# Patient Record
Sex: FEMALE | Race: WHITE | HISPANIC OR LATINO | Employment: FULL TIME | URBAN - METROPOLITAN AREA
[De-identification: names, ages, dates, MRNs, and addresses within clinical notes are randomized per-mention and may not be internally consistent; named-entity substitution may affect disease eponyms.]

---

## 2017-10-31 PROBLEM — M35.00 SJOGREN'S SYNDROME: Status: ACTIVE | Noted: 2017-10-31

## 2017-11-21 PROBLEM — M25.551 HIP PAIN, BILATERAL: Status: ACTIVE | Noted: 2017-11-21

## 2021-02-09 PROBLEM — R39.89 BLADDER PAIN: Status: ACTIVE | Noted: 2021-02-09

## 2023-08-07 PROBLEM — Z00.00 ANNUAL PHYSICAL EXAM: Status: RESOLVED | Noted: 2023-05-02 | Resolved: 2023-08-07

## 2023-12-26 ENCOUNTER — PATIENT MESSAGE (OUTPATIENT)
Dept: OBSTETRICS AND GYNECOLOGY | Facility: CLINIC | Age: 53
End: 2023-12-26
Payer: COMMERCIAL

## 2023-12-27 ENCOUNTER — PATIENT MESSAGE (OUTPATIENT)
Dept: OBSTETRICS AND GYNECOLOGY | Facility: CLINIC | Age: 53
End: 2023-12-27
Payer: COMMERCIAL

## 2023-12-29 ENCOUNTER — PATIENT MESSAGE (OUTPATIENT)
Dept: OBSTETRICS AND GYNECOLOGY | Facility: CLINIC | Age: 53
End: 2023-12-29
Payer: COMMERCIAL

## 2024-01-17 ENCOUNTER — PATIENT MESSAGE (OUTPATIENT)
Dept: OBSTETRICS AND GYNECOLOGY | Facility: CLINIC | Age: 54
End: 2024-01-17
Payer: COMMERCIAL

## 2024-01-17 DIAGNOSIS — N95.1 PERIMENOPAUSE: ICD-10-CM

## 2024-01-17 RX ORDER — NORETHINDRONE 5 MG/1
5 TABLET ORAL DAILY
Qty: 63 TABLET | Refills: 3 | Status: SHIPPED | OUTPATIENT
Start: 2024-01-17

## 2024-01-17 RX ORDER — HYDROCHLOROTHIAZIDE 25 MG/1
25 TABLET ORAL DAILY
Qty: 90 TABLET | Refills: 3 | Status: SHIPPED | OUTPATIENT
Start: 2024-01-17 | End: 2025-01-16

## 2024-01-18 RX ORDER — TESTOSTERONE CYPIONATE 200 MG/ML
50 INJECTION, SOLUTION INTRAMUSCULAR
Qty: 1 ML | Refills: 0 | Status: SHIPPED | OUTPATIENT
Start: 2024-01-18 | End: 2024-04-17 | Stop reason: SDUPTHER

## 2024-01-18 NOTE — TELEPHONE ENCOUNTER
Refill Routing Note   Medication(s) are not appropriate for processing by [facility] for the following reason(s):        Outside of protocol    [facility] action(s):  Route  Approve               Appointments  past 12m or future 3m with PCP    Date Provider   Last Visit   11/27/2023 Radha Patterson MD   Next Visit   2/20/2024 Radha Patterson MD   ED visits in past 90 days: 0        Note composed:6:07 PM 01/17/2024

## 2024-02-07 ENCOUNTER — HOSPITAL ENCOUNTER (OUTPATIENT)
Dept: RADIOLOGY | Facility: HOSPITAL | Age: 54
Discharge: HOME | End: 2024-02-07
Attending: STUDENT IN AN ORGANIZED HEALTH CARE EDUCATION/TRAINING PROGRAM
Payer: COMMERCIAL

## 2024-02-07 DIAGNOSIS — Z12.31 ENCOUNTER FOR SCREENING MAMMOGRAM FOR BREAST CANCER: ICD-10-CM

## 2024-02-07 PROCEDURE — 77067 SCR MAMMO BI INCL CAD: CPT | Mod: 26,,, | Performed by: RADIOLOGY

## 2024-02-07 PROCEDURE — 77063 BREAST TOMOSYNTHESIS BI: CPT | Mod: 26,,, | Performed by: RADIOLOGY

## 2024-02-07 PROCEDURE — 77067 SCR MAMMO BI INCL CAD: CPT | Mod: TC

## 2024-02-16 ENCOUNTER — OFFICE VISIT (OUTPATIENT)
Dept: OBSTETRICS AND GYNECOLOGY | Facility: CLINIC | Age: 54
End: 2024-02-16
Payer: COMMERCIAL

## 2024-02-16 VITALS
WEIGHT: 150 LBS | SYSTOLIC BLOOD PRESSURE: 107 MMHG | DIASTOLIC BLOOD PRESSURE: 67 MMHG | HEIGHT: 63 IN | BODY MASS INDEX: 26.58 KG/M2

## 2024-02-16 DIAGNOSIS — N95.1 MENOPAUSAL SYNDROME: Primary | ICD-10-CM

## 2024-02-16 DIAGNOSIS — E88.810 METABOLIC SYNDROME: ICD-10-CM

## 2024-02-16 DIAGNOSIS — E66.3 OVERWEIGHT: ICD-10-CM

## 2024-02-16 PROCEDURE — 1159F MED LIST DOCD IN RCRD: CPT | Mod: CPTII,S$GLB,, | Performed by: PHYSICIAN ASSISTANT

## 2024-02-16 PROCEDURE — 3074F SYST BP LT 130 MM HG: CPT | Mod: CPTII,S$GLB,, | Performed by: PHYSICIAN ASSISTANT

## 2024-02-16 PROCEDURE — 99213 OFFICE O/P EST LOW 20 MIN: CPT | Mod: S$GLB,,, | Performed by: PHYSICIAN ASSISTANT

## 2024-02-16 PROCEDURE — 3008F BODY MASS INDEX DOCD: CPT | Mod: CPTII,S$GLB,, | Performed by: PHYSICIAN ASSISTANT

## 2024-02-16 PROCEDURE — 1160F RVW MEDS BY RX/DR IN RCRD: CPT | Mod: CPTII,S$GLB,, | Performed by: PHYSICIAN ASSISTANT

## 2024-02-16 PROCEDURE — 99999 PR PBB SHADOW E&M-EST. PATIENT-LVL III: CPT | Mod: PBBFAC,,, | Performed by: PHYSICIAN ASSISTANT

## 2024-02-16 PROCEDURE — 3078F DIAST BP <80 MM HG: CPT | Mod: CPTII,S$GLB,, | Performed by: PHYSICIAN ASSISTANT

## 2024-02-16 NOTE — PROGRESS NOTES
Subjective:      Sameera Aldrich is a 53 y.o. female who presents for weight loss follow up. She is taking topamax 75mg Qhs but has been gaining weight. Increased stress with recent death in the family. Has also started having bleeding again with HRT. Scheduled with Dr. Patterson next week to discuss options. She did great with Ozempic in the past but this is no longer a covered option for. She would like to see what she can to do lose more weight.    Lab Visit on 2023   Component Date Value Ref Range Status    Estradiol 2023 29  See Text pg/mL Final   Lab Visit on 12/15/2023   Component Date Value Ref Range Status    Estradiol 12/15/2023 1907  See Text pg/mL Final    Testosterone, Free 12/15/2023 3.0  pg/mL Final    Testosterone, Total 12/15/2023 115 (H)  5 - 73 ng/dL Final    Cholesterol 12/15/2023 193  120 - 199 mg/dL Final    Triglycerides 12/15/2023 91  30 - 150 mg/dL Final    HDL 12/15/2023 31 (L)  40 - 75 mg/dL Final    LDL Cholesterol 12/15/2023 143.8  63.0 - 159.0 mg/dL Final    HDL/Cholesterol Ratio 12/15/2023 16.1 (L)  20.0 - 50.0 % Final    Total Cholesterol/HDL Ratio 12/15/2023 6.2 (H)  2.0 - 5.0 Final    Non-HDL Cholesterol 12/15/2023 162  mg/dL Final    Hemoglobin A1C 12/15/2023 5.4  4.0 - 5.6 % Final    Estimated Avg Glucose 12/15/2023 108  68 - 131 mg/dL Final       Past Medical History:   Diagnosis Date    Anemia     borderline    Encounter for blood transfusion     16 yrs ago    GERD (gastroesophageal reflux disease)     Sjogren's syndrome      Past Surgical History:   Procedure Laterality Date    BELT ABDOMINOPLASTY  2014     SECTION      COLONOSCOPY N/A 3/23/2019    Procedure: COLONOSCOPY;  Surgeon: Pete Soler MD;  Location: [address]);  Service: Endoscopy;  Laterality: N/A;    ECTOPIC PREGNANCY SURGERY      REPAIR OF COLLATERAL LIGAMENT OF THUMB Right 2022    Procedure: REPAIR, LIGAMENT, COLLATERAL, THUMB - right thumb ucl need arthrex;  Surgeon:  Manuel Baltazar MD;  Location: [facility] OR;  Service: Orthopedics;  Laterality: Right;    TOTAL REDUCTION MAMMOPLASTY      TUBAL LIGATION       Social History     Tobacco Use    Smoking status: Former    Smokeless tobacco: Former    Tobacco comments:     quit 20 years ago   Substance Use Topics    Alcohol use: Yes     Alcohol/week: 2.0 standard drinks of alcohol     Types: 2 Glasses of wine per week     Comment: socially    Drug use: No     Family History   Problem Relation Age of Onset    Breast cancer Mother 72    Heart disease Father 60    Breast cancer Maternal Aunt 65    Heart disease Maternal Uncle     Lung cancer Maternal Uncle         smoker    Heart disease Paternal Aunt     Cerebral aneurysm Maternal Grandmother     Heart disease Maternal Grandfather 52    Achalasia Paternal Grandmother     Sjogren's syndrome Paternal Grandfather     Ovarian cancer Maternal Aunt 60    Prostate cancer Maternal Uncle     Melanoma Neg Hx     Diabetes Neg Hx     Hypertension Neg Hx     Colon cancer Neg Hx     Cancer Neg Hx      OB History    Para Term  AB Living   3 2 2   1 2   SAB IAB Ectopic Multiple Live Births       1   2      # Outcome Date GA Lbr Damian/2nd Weight Sex Delivery Anes PTL Lv   3 Ectopic            2 Term      Vag-Spont   LETY   1 Term      CS-LTranv   LETY       Current Outpatient Medications:     ADIPEX-P 37.5 mg tablet, Take 37.5 mg by mouth., Disp: , Rfl:     bimatoprost (LATISSE) 0.03 % ophthalmic solution, , Disp: , Rfl:     ciclopirox (LOPROX) 0.77 % Susp, Apply topically 2 (two) times daily., Disp: 30 mL, Rfl: 3    estradioL (ESTRACE) 0.01 % (0.1 mg/gram) vaginal cream, Insert 0.5 grams with applicator or dime-sized amount with finger in vagina nightly for  2 weeks, then twice a week thereafter, Disp: 42.5 g, Rfl: 11    estradioL 1.25 mg/1.25 gram (0.1 %) GlPk, Place 1.25 mg (1 packet) onto the skin Daily., Disp: 37.5 g, Rfl: 6    finasteride (PROPECIA) 1 mg tablet, Take 1 tablet (1 mg  "total) by mouth once daily., Disp: 30 tablet, Rfl: 6    hydroCHLOROthiazide (HYDRODIURIL) 25 MG tablet, Take 1 tablet (25 mg total) by mouth once daily., Disp: 90 tablet, Rfl: 3    norethindrone (AYGESTIN) 5 mg Tab, Take 1 tablet (5 mg total) by mouth once daily. For 21 days, Disp: 63 tablet, Rfl: 3    semaglutide, weight loss, 0.25 mg/0.5 mL PnIj, Inject 0.25 mg into the skin every 7 days., Disp: 4 each, Rfl: 3    syringe with needle 3 mL 22 gauge x 1" Syrg, 1 Syringe by Misc.(Non-Drug; Combo Route) route every 30 days., Disp: 12 each, Rfl: 1    testosterone cypionate (DEPOTESTOTERONE CYPIONATE) 200 mg/mL injection, Inject 0.25 mLs (50 mg total) into the muscle every 28 days., Disp: 1 mL, Rfl: 0    Review of Systems:  General: No fever, chills.  Chest: No chest pain, shortness of breath, or palpitations.  Breast: No pain, masses, or nipple discharge.  Vulva: No pain, lesions, or itching.  Vagina: No relaxation, itching, discharge, or lesions.  Abdomen: No pain, nausea, vomiting, diarrhea, or constipation.  Urinary: No incontinence, nocturia, frequency, or dysuria.  Extremities:  No leg cramps, edema, or calf pain.  Neurologic: No headaches, dizziness, or visual changes.    Objective:     Vitals:    02/16/24 0836   BP: 107/67   Weight: 68 kg (150 lb)   Height: 5' 3" (1.6 m)   PainSc: 0-No pain     Body mass index is 26.57 kg/m².    PHYSICAL EXAM:  APPEARANCE: Well nourished, well developed, in no acute distress.  AFFECT: WNL, alert and oriented x 3      Assessment:      Menopausal syndrome    Overweight  -     semaglutide, weight loss, 0.25 mg/0.5 mL PnIj; Inject 0.25 mg into the skin every 7 days.  Dispense: 4 each; Refill: 3    Metabolic syndrome  -     semaglutide, weight loss, 0.25 mg/0.5 mL PnIj; Inject 0.25 mg into the skin every 7 days.  Dispense: 4 each; Refill: 3        Plan:   Continue low glycemic diet with lean protein at each meal.  Maintain hydration.  Reviewed the differences between Wegovy/Ozempic " and compounded Semaglutide-L-carnitine.  Start compounded semaglutide-L-carnitine 0.25mg SC weekly. Counseled on use- Faxed to [facility]  Reviewed side effects and risks of medications.  Can d/c topamax  Continue regular strength workouts and walking for exercise.  Has follow up next with Dr. Patterson about HRT  Follow up in 3 months    Instructed patient to call if she experiences any side effects or has any questions.    I spent a total of 20 minutes on the day of the visit.This includes face to face time and non-face to face time preparing to see the patient (eg, review of tests), obtaining and/or reviewing separately obtained history, documenting clinical information in the electronic or other health record, independently interpreting results and communicating results to the patient/family/caregiver, or care coordinator.

## 2024-02-20 ENCOUNTER — OFFICE VISIT (OUTPATIENT)
Dept: OBSTETRICS AND GYNECOLOGY | Facility: CLINIC | Age: 54
End: 2024-02-20
Attending: OBSTETRICS & GYNECOLOGY
Payer: COMMERCIAL

## 2024-02-20 VITALS
WEIGHT: 151 LBS | BODY MASS INDEX: 26.75 KG/M2 | DIASTOLIC BLOOD PRESSURE: 73 MMHG | HEIGHT: 63 IN | HEART RATE: 91 BPM | SYSTOLIC BLOOD PRESSURE: 108 MMHG

## 2024-02-20 DIAGNOSIS — N95.0 PMB (POSTMENOPAUSAL BLEEDING): Primary | ICD-10-CM

## 2024-02-20 PROCEDURE — 1159F MED LIST DOCD IN RCRD: CPT | Mod: CPTII,S$GLB,, | Performed by: OBSTETRICS & GYNECOLOGY

## 2024-02-20 PROCEDURE — 3078F DIAST BP <80 MM HG: CPT | Mod: CPTII,S$GLB,, | Performed by: OBSTETRICS & GYNECOLOGY

## 2024-02-20 PROCEDURE — 99213 OFFICE O/P EST LOW 20 MIN: CPT | Mod: S$GLB,,, | Performed by: OBSTETRICS & GYNECOLOGY

## 2024-02-20 PROCEDURE — 3074F SYST BP LT 130 MM HG: CPT | Mod: CPTII,S$GLB,, | Performed by: OBSTETRICS & GYNECOLOGY

## 2024-02-20 PROCEDURE — 99999 PR PBB SHADOW E&M-EST. PATIENT-LVL III: CPT | Mod: PBBFAC,,, | Performed by: OBSTETRICS & GYNECOLOGY

## 2024-02-20 PROCEDURE — 3008F BODY MASS INDEX DOCD: CPT | Mod: CPTII,S$GLB,, | Performed by: OBSTETRICS & GYNECOLOGY

## 2024-02-20 RX ORDER — MISOPROSTOL 200 UG/1
200 TABLET ORAL EVERY 12 HOURS
Qty: 2 TABLET | Refills: 0 | Status: SHIPPED | OUTPATIENT
Start: 2024-02-20 | End: 2024-03-11

## 2024-02-20 NOTE — PROGRESS NOTES
Subjective:       Patient ID: Sameera Aldrich is a 53 y.o. female.    Chief Complaint:  hormone follow up, postmenopausal hormone replacement, and postmenopausal bleeding      History of Present Illness  HPI  This 54 y/o P2 presents today with complaints of recurrence of vaginal bleeding, on HRT. She has been menopausal for the past 2 years, perimenopausal for 3 years . Started cycling with norethindrone and on low dose Testosterone since , begin additional low dose Estradiol in  and did well with resolution of symptoms and no bleeding. Began to have recurrence of hot flashes and a heavy withdrawal bleed each month since December, with painful cramps and 2-3 pads/day with clots for almost 7 days each month.  Recent ultrasound showed probable adenomysosis with a 2 mm endometrial stripe.   She has concerns about cancer, as her mother recently passed away from recurrent metastatic breast cancer and she has aunts who have had breast cancer and ovarian cancer. She would prefer not to have to stop HRT, but has had great concerns about the onset of pain and bleeding.     We discussed need for endometrial sampling as well as consideration of TLHBSO for resolution of PMB. THis would also allow use of only Estradiol instead of combination therapy which would also lower the relative risk of breast cancer.     GYN & OB History  Patient's last menstrual period was 2022.   Date of Last Pap: 3/5/2021    OB History    Para Term  AB Living   3 2 2   1 2   SAB IAB Ectopic Multiple Live Births       1   2      # Outcome Date GA Lbr Damian/2nd Weight Sex Delivery Anes PTL Lv   3 Ectopic            2 Term      Vag-Spont   LETY   1 Term      CS-LTranv   LETY       Past Medical History:   Diagnosis Date    Anemia     borderline    Encounter for blood transfusion     16 yrs ago    GERD (gastroesophageal reflux disease)     Sjogren's syndrome        Past Surgical History:   Procedure Laterality Date    BELT  "ABDOMINOPLASTY  2014     SECTION      COLONOSCOPY N/A 3/23/2019    Procedure: COLONOSCOPY;  Surgeon: Pete Soler MD;  Location: [facility] ([address] [place] [address]);  Service: Endoscopy;  Laterality: N/A;    ECTOPIC PREGNANCY SURGERY      REPAIR OF COLLATERAL LIGAMENT OF THUMB Right 2022    Procedure: REPAIR, LIGAMENT, COLLATERAL, THUMB - right thumb ucl need arthrex;  Surgeon: Manuel Baltazar MD;  Location: [facility];  Service: Orthopedics;  Laterality: Right;    TOTAL REDUCTION MAMMOPLASTY      TUBAL LIGATION         Review of Systems  Review of Systems   Constitutional:  Negative for activity change, appetite change, fatigue and unexpected weight change.   HENT: Negative.     Eyes:  Negative for visual disturbance.   Respiratory:  Negative for shortness of breath and wheezing.    Cardiovascular:  Negative for chest pain, palpitations and leg swelling.   Gastrointestinal:  Negative for abdominal pain, bloating and blood in stool.   Endocrine: Negative for diabetes and hair loss.   Genitourinary:  Positive for hot flashes and postmenopausal bleeding. Negative for decreased libido and dyspareunia.   Musculoskeletal:  Negative for back pain and joint swelling.   Integumentary:  Negative for acne, hair changes and nipple discharge.   Neurological:  Negative for headaches.   Hematological:  Does not bruise/bleed easily.   Psychiatric/Behavioral:  Negative for depression and sleep disturbance. The patient is not nervous/anxious.    Breast: Negative for mastodynia and nipple discharge          Objective:      /73   Pulse 91   Ht 5' 3" (1.6 m)   Wt 68.5 kg (151 lb)   LMP 2022   BMI 26.75 kg/m²   Physical Exam         Assessment:        1. PMB (postmenopausal bleeding)                Plan:      Problem List Items Addressed This Visit    None  Visit Diagnoses       PMB (postmenopausal bleeding)    -  Primary  (Plan on EMB), then will refer for consult for TLHBSO    Relevant Medications    miSOPROStoL " (CYTOTEC) 200 MCG Tab             Follow up if symptoms worsen or fail to improve.

## 2024-03-11 ENCOUNTER — PROCEDURE VISIT (OUTPATIENT)
Dept: OBSTETRICS AND GYNECOLOGY | Facility: CLINIC | Age: 54
End: 2024-03-11
Payer: COMMERCIAL

## 2024-03-11 VITALS
SYSTOLIC BLOOD PRESSURE: 104 MMHG | DIASTOLIC BLOOD PRESSURE: 69 MMHG | BODY MASS INDEX: 25.39 KG/M2 | HEIGHT: 63 IN | WEIGHT: 143.31 LBS

## 2024-03-11 DIAGNOSIS — N95.0 PMB (POSTMENOPAUSAL BLEEDING): Primary | ICD-10-CM

## 2024-03-11 PROCEDURE — 88305 TISSUE EXAM BY PATHOLOGIST: CPT | Performed by: PATHOLOGY

## 2024-03-11 PROCEDURE — 58100 BIOPSY OF UTERUS LINING: CPT | Mod: S$GLB,,, | Performed by: OBSTETRICS & GYNECOLOGY

## 2024-03-11 PROCEDURE — 88305 TISSUE EXAM BY PATHOLOGIST: CPT | Mod: 26,,, | Performed by: PATHOLOGY

## 2024-03-11 NOTE — PROCEDURES
Endometrial biopsy    Date/Time: 3/11/2024 8:30 AM    Performed by: Radha Patterson MD  Authorized by: Radha Patterson MD    Consent:     Consent obtained:  Prior to procedure the appropriate consent was completed and verified    Consent given by:  Patient    Patient questions answered: yes      Patient agrees, verbalizes understanding, and wants to proceed: yes      Educational handouts given: yes      Instructions and paperwork completed: yes    Indication:     Indications: Post-menopausal bleeding      Chronicity of post-menopausal bleeding:  New    Progression of post-menopausal bleeding:  Waxing and waning  Pre-procedure:     Pre-procedure timeout performed: yes    Procedure:     Procedure: endometrial biopsy with Pipelle      Cervix cleaned and prepped: yes      A paracervical block was performed: no      An intracervical block was performed: no      The cervix was dilated: no      Uterus sounded: yes      Uterus sound depth (cm):  8    Specimen collected: specimen collected and sent to pathology      Patient tolerated procedure well with no complications: yes

## 2024-03-11 NOTE — Clinical Note
PLease see/Schedule Dr. Brasher for a consult for TLHBSO, has had recurrence of bleeding on her HRT,after no bleeding for almost 2 years, along with very painful withdrawal bleeding. U/s suspicious for adenomyosis . I did EMB today and she is interested in permanent surgical resolution , does not want to stop ERT as she feels better on it.

## 2024-03-12 ENCOUNTER — TELEPHONE (OUTPATIENT)
Dept: OBSTETRICS AND GYNECOLOGY | Facility: CLINIC | Age: 54
End: 2024-03-12
Payer: COMMERCIAL

## 2024-03-13 LAB
FINAL PATHOLOGIC DIAGNOSIS: NORMAL
GROSS: NORMAL
Lab: NORMAL

## 2024-03-26 ENCOUNTER — OFFICE VISIT (OUTPATIENT)
Dept: OBSTETRICS AND GYNECOLOGY | Facility: CLINIC | Age: 54
End: 2024-03-26
Payer: COMMERCIAL

## 2024-03-26 ENCOUNTER — TELEPHONE (OUTPATIENT)
Dept: OBSTETRICS AND GYNECOLOGY | Facility: CLINIC | Age: 54
End: 2024-03-26
Payer: COMMERCIAL

## 2024-03-26 VITALS
WEIGHT: 152.75 LBS | BODY MASS INDEX: 27.07 KG/M2 | DIASTOLIC BLOOD PRESSURE: 82 MMHG | SYSTOLIC BLOOD PRESSURE: 112 MMHG | HEIGHT: 63 IN

## 2024-03-26 DIAGNOSIS — N80.03 ADENOMYOSIS OF UTERUS: ICD-10-CM

## 2024-03-26 DIAGNOSIS — G89.29 CHRONIC PELVIC PAIN IN FEMALE: ICD-10-CM

## 2024-03-26 DIAGNOSIS — N94.6 SEVERE DYSMENORRHEA: ICD-10-CM

## 2024-03-26 DIAGNOSIS — N95.0 PMB (POSTMENOPAUSAL BLEEDING): Primary | ICD-10-CM

## 2024-03-26 DIAGNOSIS — R10.2 CHRONIC PELVIC PAIN IN FEMALE: ICD-10-CM

## 2024-03-26 PROCEDURE — 99214 OFFICE O/P EST MOD 30 MIN: CPT | Mod: S$GLB,,, | Performed by: OBSTETRICS & GYNECOLOGY

## 2024-03-26 PROCEDURE — 3079F DIAST BP 80-89 MM HG: CPT | Mod: CPTII,S$GLB,, | Performed by: OBSTETRICS & GYNECOLOGY

## 2024-03-26 PROCEDURE — 99999 PR PBB SHADOW E&M-EST. PATIENT-LVL IV: CPT | Mod: PBBFAC,,, | Performed by: OBSTETRICS & GYNECOLOGY

## 2024-03-26 PROCEDURE — 3008F BODY MASS INDEX DOCD: CPT | Mod: CPTII,S$GLB,, | Performed by: OBSTETRICS & GYNECOLOGY

## 2024-03-26 PROCEDURE — 3074F SYST BP LT 130 MM HG: CPT | Mod: CPTII,S$GLB,, | Performed by: OBSTETRICS & GYNECOLOGY

## 2024-03-26 PROCEDURE — 1159F MED LIST DOCD IN RCRD: CPT | Mod: CPTII,S$GLB,, | Performed by: OBSTETRICS & GYNECOLOGY

## 2024-03-26 NOTE — TELEPHONE ENCOUNTER
Patient informed do NOT take Semaglutide, 7 days prior to surgery date. Patient verbalized understanding.

## 2024-03-26 NOTE — PROGRESS NOTES
Consultation Note:    Reason for Consultation:  Persistent postmenopausal bleeding with an enlarged uterus    Consulting Physician:  Radha Patterson MD    Sameera Aldrich is a 53 y.o.   patient who presents for evaluation and discussion of treatment options for persistent postmenopausal bleeding.  Patient reports persistent postmenopausal bleeding with significant discomfort during bleeding episodes.  Patient has longstanding history of chronic pelvic pain and severe dysmenorrhea.  Patient is menopausal on HRT with regular heavy bleeding similar to her reported menstrual cycles in past.  Patient desires to remain on HRT and is considering definitive surgical management with a minimally invasive hysterectomy/bilateral salpingo-oophorectomy.  Patient with excellent workup from her primary gynecologist (Dr. Radha Patterson).      LMP: Patient's last menstrual period was 2022..  Patient reports worsening of quality of life due to these persistent bleeding episodes.    Chart Reviewed.  Recent pelvic sonogram (2023) revealed a mildly enlarged uterus with signs of adenomyosis    Past Medical History:   Diagnosis Date    Anemia     borderline    Encounter for blood transfusion     16 yrs ago    GERD (gastroesophageal reflux disease)     Sjogren's syndrome      Past Surgical History:   Procedure Laterality Date    BELT ABDOMINOPLASTY  2014     SECTION      COLONOSCOPY N/A 2019    Procedure: COLONOSCOPY;  Surgeon: Pete Soler MD;  Location: [facility] ([address] [place] [address]);  Service: Endoscopy;  Laterality: N/A;    ECTOPIC PREGNANCY SURGERY      right unsure if tube removed    REPAIR OF COLLATERAL LIGAMENT OF THUMB Right 2022    Procedure: REPAIR, LIGAMENT, COLLATERAL, THUMB - right thumb ucl need arthrex;  Surgeon: Manuel Baltazar MD;  Location: [facility];  Service: Orthopedics;  Laterality: Right;    TOTAL REDUCTION MAMMOPLASTY  1987    TUBAL LIGATION       Social  History     Socioeconomic History    Marital status:    Tobacco Use    Smoking status: Former    Smokeless tobacco: Former    Tobacco comments:     quit 20 years ago   Substance and Sexual Activity    Alcohol use: Yes     Alcohol/week: 2.0 standard drinks of alcohol     Types: 2 Glasses of wine per week     Comment: weekends    Drug use: No    Sexual activity: Yes     Partners: Male     Birth control/protection: See Surgical Hx     Comment:  to Kwame Yousif 28   Other Topics Concern    Are you pregnant or think you may be? No    Breast-feeding No     Social Determinants of Health     Financial Resource Strain: Low Risk  (3/11/2024)    Overall Financial Resource Strain (CARDIA)     Difficulty of Paying Living Expenses: Not hard at all   Food Insecurity: No Food Insecurity (3/11/2024)    Hunger Vital Sign     Worried About Running Out of Food in the Last Year: Never true     Ran Out of Food in the Last Year: Never true   Transportation Needs: No Transportation Needs (3/11/2024)    PRAPARE - Transportation     Lack of Transportation (Medical): No     Lack of Transportation (Non-Medical): No   Physical Activity: Insufficiently Active (3/11/2024)    Exercise Vital Sign     Days of Exercise per Week: 3 days     Minutes of Exercise per Session: 30 min   Stress: No Stress Concern Present (3/11/2024)    [place] [employer] - Occupational Stress Questionnaire     Feeling of Stress : Only a little   Social Connections: Unknown (3/11/2024)    Social Connection and Isolation Panel [NHANES]     Frequency of Communication with Friends and Family: More than three times a week     Frequency of Social Gatherings with Friends and Family: More than three times a week     Active Member of Clubs or Organizations: Yes     Attends Club or Organization Meetings: More than 4 times per year     Marital Status:    Housing Stability: Low Risk  (3/11/2024)    Housing Stability Vital Sign     Unable to  "Pay for Housing in the Last Year: No     Number of Places Lived in the Last Year: 1     Unstable Housing in the Last Year: No     Family History   Problem Relation Age of Onset    Sjogren's syndrome Paternal Grandfather     Achalasia Paternal Grandmother     Cerebral aneurysm Maternal Grandmother     Heart disease Maternal Grandfather 52    Heart disease Father 60    Breast cancer Mother 72    Breast cancer Maternal Aunt 65    Ovarian cancer Maternal Aunt 60    Heart disease Maternal Uncle     Lung cancer Maternal Uncle         smoker    Prostate cancer Maternal Uncle     Heart disease Paternal Aunt     Melanoma Neg Hx     Diabetes Neg Hx     Hypertension Neg Hx     Colon cancer Neg Hx     Cancer Neg Hx      OB History          3    Para   2    Term   2            AB   1    Living   2         SAB        IAB        Ectopic   1    Multiple        Live Births   2                 /82   Ht 5' 3" (1.6 m)   Wt 69.3 kg (152 lb 12.5 oz)   LMP 2022   BMI 27.06 kg/m²       ROS:  GENERAL: Denies weight gain or weight loss. Feeling well overall.   SKIN: Denies rash or lesions.   HEAD: Denies head injury or headache.   NODES: Denies enlarged lymph nodes.   CHEST: Denies chest pain or shortness of breath.   CARDIOVASCULAR: Denies palpitations or left sided chest pain.   ABDOMEN: No abdominal pain, constipation, diarrhea, nausea, vomiting or rectal bleeding.   URINARY: No frequency, dysuria, hematuria, or burning on urination.  REPRODUCTIVE: See HPI.   BREASTS: The patient performs breast self-examination and denies pain, lumps, or nipple discharge.   HEMATOLOGIC: No easy bruisability or excessive bleeding.   MUSCULOSKELETAL: Denies joint pain or swelling.   NEUROLOGIC: Denies syncope or weakness.   PSYCHIATRIC: Denies depression, anxiety or mood swings.    PHYSICAL EXAM:  APPEARANCE: Well nourished, well developed, in no acute distress.  AFFECT: WNL, alert and oriented x 3  SKIN: No acne or " hirsutism  NECK: Neck symmetric without masses or thyromegaly  NODES: No inguinal, cervical, axillary, or femoral lymph node enlargement  CHEST: Good respiratory effect  ABDOMEN: Soft.  No tenderness or masses.  No hepatosplenomegaly.  No hernias.  No peritoneal signs.  Abdominoplasty scar noted.  PELVIC: Normal external genitalia without lesions.  Normal hair distribution.  Adequate perineal body, normal urethral meatus.  Vagina with rugae and without lesions or discharge.  Cervix pink, without lesions, discharge or tenderness.  No significant cystocele or rectocele.  Bimanual exam shows uterus to be enlarged - 10-12 weeks size, regular, mobile and nontender.  Adnexa without masses but mild discomfort noted to bimanual exam (bilaterally).  EXTREMITIES: No edema.    Impression:  1. PMB (postmenopausal bleeding)        2. Chronic pelvic pain in female        3. Severe dysmenorrhea        4.      Adenomyosis of uterus    Plan:  Discussed medical and surgical treatment options for persistent postmenopausal bleeding.    Minimally invasive hysterectomy with bilateral salpingo-oophorectomy options reviewed in detail.  Patient is strongly considering this definitive surgical management option.     Consulting Physician to be notified of above findings/plan.    Baltazar Stack Iv, MD         2 seconds or less

## 2024-04-17 DIAGNOSIS — Z78.0 MENOPAUSE PRESENT: ICD-10-CM

## 2024-04-17 DIAGNOSIS — N95.1 PERIMENOPAUSE: ICD-10-CM

## 2024-04-17 RX ORDER — SYRINGE WITH NEEDLE, 1 ML 25GX5/8"
1 SYRINGE, EMPTY DISPOSABLE MISCELLANEOUS
Qty: 12 EACH | Refills: 1 | Status: CANCELLED | OUTPATIENT
Start: 2024-04-17

## 2024-04-17 RX ORDER — TESTOSTERONE CYPIONATE 200 MG/ML
50 INJECTION, SOLUTION INTRAMUSCULAR
Qty: 1 ML | Refills: 0 | Status: CANCELLED | OUTPATIENT
Start: 2024-04-17

## 2024-04-17 RX ORDER — TESTOSTERONE CYPIONATE 200 MG/ML
50 INJECTION, SOLUTION INTRAMUSCULAR
Qty: 1 ML | Refills: 0 | Status: SHIPPED | OUTPATIENT
Start: 2024-04-17

## 2024-04-17 RX ORDER — SYRINGE WITH NEEDLE, 1 ML 25GX5/8"
1 SYRINGE, EMPTY DISPOSABLE MISCELLANEOUS
Qty: 12 EACH | Refills: 1 | Status: SHIPPED | OUTPATIENT
Start: 2024-04-17

## 2024-04-17 NOTE — TELEPHONE ENCOUNTER
Refill Routing Note   Medication(s) are not appropriate for processing by [facility] for the following reason(s):        Outside of protocol    [facility] action(s):  Route        Medication Therapy Plan: Syringes for testosterone ( off protocol med) are also off protocol      Appointments  past 12m or future 3m with PCP    Date Provider   Last Visit   3/11/2024 Radha Patterson MD   Next Visit   Visit date not found Radha Patterson MD   ED visits in past 90 days: 0        Note composed:1:35 PM 04/17/2024

## 2024-05-01 ENCOUNTER — OFFICE VISIT (OUTPATIENT)
Dept: OBSTETRICS AND GYNECOLOGY | Facility: CLINIC | Age: 54
End: 2024-05-01
Payer: COMMERCIAL

## 2024-05-01 VITALS — BODY MASS INDEX: 26.58 KG/M2 | WEIGHT: 150 LBS | HEIGHT: 63 IN

## 2024-05-01 DIAGNOSIS — E66.3 OVERWEIGHT: ICD-10-CM

## 2024-05-01 DIAGNOSIS — E88.810 METABOLIC SYNDROME: Primary | ICD-10-CM

## 2024-05-01 PROCEDURE — 3008F BODY MASS INDEX DOCD: CPT | Mod: CPTII,95,, | Performed by: PHYSICIAN ASSISTANT

## 2024-05-01 PROCEDURE — 1159F MED LIST DOCD IN RCRD: CPT | Mod: CPTII,95,, | Performed by: PHYSICIAN ASSISTANT

## 2024-05-01 PROCEDURE — 1160F RVW MEDS BY RX/DR IN RCRD: CPT | Mod: CPTII,95,, | Performed by: PHYSICIAN ASSISTANT

## 2024-05-01 PROCEDURE — 99213 OFFICE O/P EST LOW 20 MIN: CPT | Mod: 95,,, | Performed by: PHYSICIAN ASSISTANT

## 2024-05-01 NOTE — PROGRESS NOTES
The patient location is: work  The chief complaint leading to consultation is: follow up for weight loss    Visit type: audiovisual    Face to Face time with patient: 10 minutes  15 minutes of total time spent on the encounter, which includes face to face time and non-face to face time preparing to see the patient (eg, review of tests), Obtaining and/or reviewing separately obtained history, Documenting clinical information in the electronic or other health record, Independently interpreting results (not separately reported) and communicating results to the patient/family/caregiver, or Care coordination (not separately reported).         Each patient to whom he or she provides medical services by telemedicine is:  (1) informed of the relationship between the physician and patient and the respective role of any other health care provider with respect to management of the patient; and (2) notified that he or she may decline to receive medical services by telemedicine and may withdraw from such care at any time.    Notes:   Subjective:      Sameera Aldrich is a 54 y.o. female who presents for weight loss follow up. She is taking compounded semaglutide 0.25mg Sc weekly. Tolerating well. Losing weight but very slow. Still feels hungry and having cravings throughout the day. Continues to eat well. Not sleeping well due to night sweats. Continued PMB on HRT so having hyst/bso in June 2024 with Dr. Stack. Currently on divigel 1.25mg daily, aygestin 5mg and testosterone IM.     Procedure visit on 03/11/2024   Component Date Value Ref Range Status    Final Pathologic Diagnosis 03/11/2024 Small fragments of inactive endometrium, no atypical hyperplasia or malignancy identified.   Final    Gross 03/11/2024    Final                    Value:Pathology ID:  [id number]  Patient ID:  [id number]  The specimen is received in formalin labeled &quot;EMB&quot;.  The specimen consists of multiple tan fragments of soft tissue measuring  1.6 x 1.2 x 0.1 cm in aggregate.  The specimen is submitted entirely in cassette CDG--1-A.    Yoan Blanco      Disclaimer 2024 Unless the case is a 'gross only' or additional testing only, the final diagnosis for each specimen is based on a microscopic examination of appropriate tissue sections.   Final       Past Medical History:   Diagnosis Date    Anemia     borderline    Encounter for blood transfusion     16 yrs ago    GERD (gastroesophageal reflux disease)     Sjogren's syndrome      Past Surgical History:   Procedure Laterality Date    BELT ABDOMINOPLASTY       SECTION      COLONOSCOPY N/A 2019    Procedure: COLONOSCOPY;  Surgeon: Pete Soler MD;  Location: [facility] ([address] [place] [address]);  Service: Endoscopy;  Laterality: N/A;    ECTOPIC PREGNANCY SURGERY      right unsure if tube removed    REPAIR OF COLLATERAL LIGAMENT OF THUMB Right 2022    Procedure: REPAIR, LIGAMENT, COLLATERAL, THUMB - right thumb ucl need arthrex;  Surgeon: Manuel Baltazar MD;  Location: [facility];  Service: Orthopedics;  Laterality: Right;    TOTAL REDUCTION MAMMOPLASTY      TUBAL LIGATION       Social History     Tobacco Use    Smoking status: Former    Smokeless tobacco: Former    Tobacco comments:     quit 20 years ago   Substance Use Topics    Alcohol use: Yes     Alcohol/week: 2.0 standard drinks of alcohol     Types: 2 Glasses of wine per week     Comment: weekends    Drug use: No     Family History   Problem Relation Name Age of Onset    Sjogren's syndrome Paternal Grandfather      Achalasia Paternal Grandmother      Cerebral aneurysm Maternal Grandmother      Heart disease Maternal Grandfather  52    Heart disease Father  60    Breast cancer Mother  72    Breast cancer Maternal Aunt  65    Ovarian cancer Maternal Aunt  60    Heart disease Maternal Uncle      Lung cancer Maternal Uncle          smoker    Prostate cancer Maternal Uncle      Heart disease Paternal Aunt      Melanoma  "Neg Hx      Diabetes Neg Hx      Hypertension Neg Hx      Colon cancer Neg Hx      Cancer Neg Hx       OB History    Para Term  AB Living   3 2 2   1 2   SAB IAB Ectopic Multiple Live Births       1   2      # Outcome Date GA Lbr Damian/2nd Weight Sex Type Anes PTL Lv   3 Term      CS-LTranv   LETY   2 Term      Vag-Spont   LETY   1 Ectopic                Current Outpatient Medications:     estradioL (ESTRACE) 0.01 % (0.1 mg/gram) vaginal cream, Insert 0.5 grams with applicator or dime-sized amount with finger in vagina nightly for  2 weeks, then twice a week thereafter, Disp: 42.5 g, Rfl: 11    estradioL 1.25 mg/1.25 gram (0.1 %) GlPk, Place 1.25 mg (1 packet) onto the skin Daily., Disp: 37.5 g, Rfl: 6    finasteride (PROPECIA) 1 mg tablet, Take 1 tablet (1 mg total) by mouth once daily., Disp: 30 tablet, Rfl: 6    hydroCHLOROthiazide (HYDRODIURIL) 25 MG tablet, Take 1 tablet (25 mg total) by mouth once daily., Disp: 90 tablet, Rfl: 3    norethindrone (AYGESTIN) 5 mg Tab, Take 1 tablet (5 mg total) by mouth once daily. For 21 days, Disp: 63 tablet, Rfl: 3    semaglutide, weight loss, 0.5 mg/0.5 mL PnIj, Inject 0.5 mg into the skin every 7 days., Disp: , Rfl:     syringe with needle (BD LUER-JEFFREY SYRINGE) 3 mL 22 gauge x 1" Syrg, 1 Syringe by Misc.(Non-Drug; Combo Route) route every 30 days., Disp: 12 each, Rfl: 1    testosterone cypionate (DEPOTESTOTERONE CYPIONATE) 200 mg/mL injection, Inject 0.25 mLs (50 mg total) into the muscle every 28 days., Disp: 1 mL, Rfl: 0    Review of Systems:  General: No fever, chills.  Chest: No chest pain, shortness of breath, or palpitations.  Breast: No pain, masses, or nipple discharge.  Vulva: No pain, lesions, or itching.  Vagina: No relaxation, itching, discharge, or lesions.  Abdomen: No pain, nausea, vomiting, diarrhea, or constipation.  Urinary: No incontinence, nocturia, frequency, or dysuria.  Extremities:  No leg cramps, edema, or calf pain.  Neurologic: " "No headaches, dizziness, or visual changes.    Objective:     Vitals:    05/01/24 0738   Weight: 68 kg (150 lb)   Height: 5' 3" (1.6 m)     Body mass index is 26.57 kg/m².    PHYSICAL EXAM:  APPEARANCE: Well nourished, well developed, in no acute distress.  AFFECT: WNL, alert and oriented x 3      Assessment:      Metabolic syndrome    Overweight  -     semaglutide, weight loss, 0.5 mg/0.5 mL PnIj; Inject 0.5 mg into the skin every 7 days.        Plan:   Continue low glycemic diet with lean protein at each meal.  Maintain hydration.  Increase compounded semaglutide to 0.5mg SC weekly- faxed to April.   She knows to stop 2 weeks prior to surgery.  Consider transitioning to wegovy with upcoming insurance changes.  Follow up in 3 months    Instructed patient to call if she experiences any side effects or has any questions.    I spent a total of 15 minutes on the day of the visit.This includes face to face time and non-face to face time preparing to see the patient (eg, review of tests), obtaining and/or reviewing separately obtained history, documenting clinical information in the electronic or other health record, independently interpreting results and communicating results to the patient/family/caregiver, or care coordinator.   "

## 2024-05-23 ENCOUNTER — PATIENT MESSAGE (OUTPATIENT)
Dept: OBSTETRICS AND GYNECOLOGY | Facility: CLINIC | Age: 54
End: 2024-05-23
Payer: COMMERCIAL

## 2024-05-23 ENCOUNTER — OFFICE VISIT (OUTPATIENT)
Dept: DERMATOLOGY | Facility: CLINIC | Age: 54
End: 2024-05-23
Payer: COMMERCIAL

## 2024-05-23 DIAGNOSIS — L71.9 ROSACEA: Primary | ICD-10-CM

## 2024-05-23 PROCEDURE — 99214 OFFICE O/P EST MOD 30 MIN: CPT | Mod: S$GLB,,, | Performed by: STUDENT IN AN ORGANIZED HEALTH CARE EDUCATION/TRAINING PROGRAM

## 2024-05-23 PROCEDURE — 99999 PR PBB SHADOW E&M-EST. PATIENT-LVL III: CPT | Mod: PBBFAC,,, | Performed by: STUDENT IN AN ORGANIZED HEALTH CARE EDUCATION/TRAINING PROGRAM

## 2024-05-23 PROCEDURE — 1160F RVW MEDS BY RX/DR IN RCRD: CPT | Mod: CPTII,S$GLB,, | Performed by: STUDENT IN AN ORGANIZED HEALTH CARE EDUCATION/TRAINING PROGRAM

## 2024-05-23 PROCEDURE — 1159F MED LIST DOCD IN RCRD: CPT | Mod: CPTII,S$GLB,, | Performed by: STUDENT IN AN ORGANIZED HEALTH CARE EDUCATION/TRAINING PROGRAM

## 2024-05-23 NOTE — PROGRESS NOTES
Subjective:      Patient ID:  Sameera Aldrich is a 54 y.o. female who presents for   Chief Complaint   Patient presents with    Rosacea     Face      54 y.o. female presents today for a rosacea.    Last office visit 4/6/2021 for itching to face.         Rosacea - Follow-up  Symptom course: worsening  Currently using: Rhofade.  Affected locations: face  Signs / symptoms: redness  Severity: mild    Longstanding history of rosacea. Has been using rhofade for several years but her rosacea has been worsening    Review of Systems   HENT:  Negative for headaches.    Eyes:  Negative for itching, eye watering, eye irritation and eyelid inflammation.   Gastrointestinal:  Negative for nausea, vomiting, diarrhea and Sensitivity to oral antibiotics.        Vascular instability syndrome: rosacea associated with GI sx and HA's   Skin:  Positive for wears hat. Negative for daily sunscreen use, activity-related sunscreen use and recent sunburn.   Neurological:  Negative for headaches.   Hematologic/Lymphatic: Does not bruise/bleed easily.       Objective:   Physical Exam   Constitutional: She appears well-developed and well-nourished. No distress.   Neurological: She is alert and oriented to person, place, and time. She is not disoriented.   Psychiatric: She has a normal mood and affect.   Skin:   Areas Examined (abnormalities noted in diagram):   Head / Face Inspection Performed            Diagram Legend     Erythematous scaling macule/papule c/w actinic keratosis       Vascular papule c/w angioma      Pigmented verrucoid papule/plaque c/w seborrheic keratosis      Yellow umbilicated papule c/w sebaceous hyperplasia      Irregularly shaped tan macule c/w lentigo     1-2 mm smooth white papules consistent with Milia      Movable subcutaneous cyst with punctum c/w epidermal inclusion cyst      Subcutaneous movable cyst c/w pilar cyst      Firm pink to brown papule c/w dermatofibroma      Pedunculated fleshy papule(s) c/w  skin tag(s)      Evenly pigmented macule c/w junctional nevus     Mildly variegated pigmented, slightly irregular-bordered macule c/w mildly atypical nevus      Flesh colored to evenly pigmented papule c/w intradermal nevus       Pink pearly papule/plaque c/w basal cell carcinoma      Erythematous hyperkeratotic cursted plaque c/w SCC      Surgical scar with no sign of skin cancer recurrence      Open and closed comedones      Inflammatory papules and pustules      Verrucoid papule consistent consistent with wart     Erythematous eczematous patches and plaques     Dystrophic onycholytic nail with subungual debris c/w onychomycosis     Umbilicated papule    Erythematous-base heme-crusted tan verrucoid plaque consistent with inflamed seborrheic keratosis     Erythematous Silvery Scaling Plaque c/w Psoriasis     See annotation      Assessment / Plan:        Rosacea  - erythematous papules in b/l cheeks, flaring despite rhofade  - start compounded topical as below  -     metroNIDAZOLE (NORITATE) 1 % cream; Compound azelaic acid 15% + ivermectin 1% + metronidazole 1% cream. Apply to face once or twice daily.  Dispense: 30 g; Refill: 6         Follow up in about 2 months (around 7/23/2024).

## 2024-05-23 NOTE — PATIENT INSTRUCTIONS
Your prescription has been sent to the compounding pharmacy [facility].  They are located in [place] at [address] just before the [address].  If you have any questions, please call the pharmacy at [phone].  Website: [email or web address]

## 2024-06-03 ENCOUNTER — ANESTHESIA EVENT (OUTPATIENT)
Dept: SURGERY | Facility: OTHER | Age: 54
End: 2024-06-03
Payer: COMMERCIAL

## 2024-06-03 RX ORDER — LIDOCAINE HYDROCHLORIDE 10 MG/ML
0.5 INJECTION, SOLUTION EPIDURAL; INFILTRATION; INTRACAUDAL; PERINEURAL ONCE
Status: CANCELLED | OUTPATIENT
Start: 2024-06-03 | End: 2024-06-03

## 2024-06-03 RX ORDER — SODIUM CHLORIDE, SODIUM LACTATE, POTASSIUM CHLORIDE, CALCIUM CHLORIDE 600; 310; 30; 20 MG/100ML; MG/100ML; MG/100ML; MG/100ML
INJECTION, SOLUTION INTRAVENOUS CONTINUOUS
Status: CANCELLED | OUTPATIENT
Start: 2024-06-03

## 2024-06-03 RX ORDER — PREGABALIN 75 MG/1
75 CAPSULE ORAL ONCE
Status: CANCELLED | OUTPATIENT
Start: 2024-06-03 | End: 2024-06-03

## 2024-06-03 RX ORDER — ACETAMINOPHEN 500 MG
1000 TABLET ORAL
Status: CANCELLED | OUTPATIENT
Start: 2024-06-03 | End: 2024-06-03

## 2024-06-06 ENCOUNTER — OFFICE VISIT (OUTPATIENT)
Dept: OBSTETRICS AND GYNECOLOGY | Facility: CLINIC | Age: 54
End: 2024-06-06
Payer: COMMERCIAL

## 2024-06-06 ENCOUNTER — PATIENT MESSAGE (OUTPATIENT)
Dept: OBSTETRICS AND GYNECOLOGY | Facility: CLINIC | Age: 54
End: 2024-06-06

## 2024-06-06 ENCOUNTER — HOSPITAL ENCOUNTER (OUTPATIENT)
Dept: PREADMISSION TESTING | Facility: OTHER | Age: 54
Discharge: HOME | End: 2024-06-06
Attending: OBSTETRICS & GYNECOLOGY
Payer: COMMERCIAL

## 2024-06-06 VITALS
BODY MASS INDEX: 28.52 KG/M2 | SYSTOLIC BLOOD PRESSURE: 118 MMHG | HEIGHT: 63 IN | WEIGHT: 160.94 LBS | DIASTOLIC BLOOD PRESSURE: 78 MMHG

## 2024-06-06 VITALS
SYSTOLIC BLOOD PRESSURE: 127 MMHG | RESPIRATION RATE: 17 BRPM | HEIGHT: 63 IN | WEIGHT: 160 LBS | HEART RATE: 83 BPM | BODY MASS INDEX: 28.35 KG/M2 | DIASTOLIC BLOOD PRESSURE: 76 MMHG | OXYGEN SATURATION: 100 % | TEMPERATURE: 98 F

## 2024-06-06 DIAGNOSIS — N80.03 ADENOMYOSIS OF UTERUS: Primary | ICD-10-CM

## 2024-06-06 DIAGNOSIS — R10.2 CHRONIC PELVIC PAIN IN FEMALE: ICD-10-CM

## 2024-06-06 DIAGNOSIS — N85.2 BULKY OR ENLARGED UTERUS: ICD-10-CM

## 2024-06-06 DIAGNOSIS — Z32.02 NEGATIVE PREGNANCY TEST: ICD-10-CM

## 2024-06-06 DIAGNOSIS — Z01.818 PREOP TESTING: Primary | ICD-10-CM

## 2024-06-06 DIAGNOSIS — N95.0 POST-MENOPAUSAL BLEEDING: ICD-10-CM

## 2024-06-06 DIAGNOSIS — G89.29 CHRONIC PELVIC PAIN IN FEMALE: ICD-10-CM

## 2024-06-06 LAB
ABO + RH BLD: NORMAL
ANION GAP SERPL CALC-SCNC: 8 MMOL/L (ref 8–16)
B-HCG UR QL: NEGATIVE
BASOPHILS # BLD AUTO: 0.03 K/UL (ref 0–0.2)
BASOPHILS NFR BLD: 0.4 % (ref 0–1.9)
BLD GP AB SCN CELLS X3 SERPL QL: NORMAL
BUN SERPL-MCNC: 14 MG/DL (ref 6–20)
CALCIUM SERPL-MCNC: 9.9 MG/DL (ref 8.7–10.5)
CHLORIDE SERPL-SCNC: 106 MMOL/L (ref 95–110)
CO2 SERPL-SCNC: 24 MMOL/L (ref 23–29)
CREAT SERPL-MCNC: 0.8 MG/DL (ref 0.5–1.4)
CTP QC/QA: YES
DIFFERENTIAL METHOD BLD: ABNORMAL
EOSINOPHIL # BLD AUTO: 0.1 K/UL (ref 0–0.5)
EOSINOPHIL NFR BLD: 1.1 % (ref 0–8)
ERYTHROCYTE [DISTWIDTH] IN BLOOD BY AUTOMATED COUNT: 16.3 % (ref 11.5–14.5)
EST. GFR  (NO RACE VARIABLE): >60 ML/MIN/1.73 M^2
GLUCOSE SERPL-MCNC: 98 MG/DL (ref 70–110)
HCT VFR BLD AUTO: 43.9 % (ref 37–48.5)
HGB BLD-MCNC: 13.9 G/DL (ref 12–16)
IMM GRANULOCYTES # BLD AUTO: 0.02 K/UL (ref 0–0.04)
IMM GRANULOCYTES NFR BLD AUTO: 0.3 % (ref 0–0.5)
LYMPHOCYTES # BLD AUTO: 2 K/UL (ref 1–4.8)
LYMPHOCYTES NFR BLD: 27.5 % (ref 18–48)
MCH RBC QN AUTO: 28.4 PG (ref 27–31)
MCHC RBC AUTO-ENTMCNC: 31.7 G/DL (ref 32–36)
MCV RBC AUTO: 90 FL (ref 82–98)
MONOCYTES # BLD AUTO: 0.6 K/UL (ref 0.3–1)
MONOCYTES NFR BLD: 9 % (ref 4–15)
NEUTROPHILS # BLD AUTO: 4.4 K/UL (ref 1.8–7.7)
NEUTROPHILS NFR BLD: 61.7 % (ref 38–73)
NRBC BLD-RTO: 0 /100 WBC
PLATELET # BLD AUTO: 435 K/UL (ref 150–450)
PMV BLD AUTO: 9.4 FL (ref 9.2–12.9)
POTASSIUM SERPL-SCNC: 4.7 MMOL/L (ref 3.5–5.1)
RBC # BLD AUTO: 4.89 M/UL (ref 4–5.4)
SODIUM SERPL-SCNC: 138 MMOL/L (ref 136–145)
SPECIMEN OUTDATE: NORMAL
WBC # BLD AUTO: 7.08 K/UL (ref 3.9–12.7)

## 2024-06-06 PROCEDURE — 1159F MED LIST DOCD IN RCRD: CPT | Mod: CPTII,S$GLB,, | Performed by: OBSTETRICS & GYNECOLOGY

## 2024-06-06 PROCEDURE — 36415 COLL VENOUS BLD VENIPUNCTURE: CPT | Performed by: ANESTHESIOLOGY

## 2024-06-06 PROCEDURE — 99214 OFFICE O/P EST MOD 30 MIN: CPT | Mod: S$GLB,,, | Performed by: OBSTETRICS & GYNECOLOGY

## 2024-06-06 PROCEDURE — 3008F BODY MASS INDEX DOCD: CPT | Mod: CPTII,S$GLB,, | Performed by: OBSTETRICS & GYNECOLOGY

## 2024-06-06 PROCEDURE — 86850 RBC ANTIBODY SCREEN: CPT | Performed by: ANESTHESIOLOGY

## 2024-06-06 PROCEDURE — 81025 URINE PREGNANCY TEST: CPT | Mod: S$GLB,,, | Performed by: OBSTETRICS & GYNECOLOGY

## 2024-06-06 PROCEDURE — 3074F SYST BP LT 130 MM HG: CPT | Mod: CPTII,S$GLB,, | Performed by: OBSTETRICS & GYNECOLOGY

## 2024-06-06 PROCEDURE — 80048 BASIC METABOLIC PNL TOTAL CA: CPT | Performed by: ANESTHESIOLOGY

## 2024-06-06 PROCEDURE — 3078F DIAST BP <80 MM HG: CPT | Mod: CPTII,S$GLB,, | Performed by: OBSTETRICS & GYNECOLOGY

## 2024-06-06 PROCEDURE — 99999 PR PBB SHADOW E&M-EST. PATIENT-LVL III: CPT | Mod: PBBFAC,,, | Performed by: OBSTETRICS & GYNECOLOGY

## 2024-06-06 PROCEDURE — 85025 COMPLETE CBC W/AUTO DIFF WBC: CPT | Performed by: ANESTHESIOLOGY

## 2024-06-06 RX ORDER — FAMOTIDINE 20 MG/1
20 TABLET, FILM COATED ORAL
Status: CANCELLED | OUTPATIENT
Start: 2024-06-10

## 2024-06-06 RX ORDER — ACETAMINOPHEN 500 MG
1000 TABLET ORAL
Status: CANCELLED | OUTPATIENT
Start: 2024-06-10

## 2024-06-06 RX ORDER — SODIUM CHLORIDE 9 MG/ML
INJECTION, SOLUTION INTRAVENOUS CONTINUOUS
Status: CANCELLED | OUTPATIENT
Start: 2024-06-10

## 2024-06-06 RX ORDER — CEFAZOLIN SODIUM 2 G/50ML
2 SOLUTION INTRAVENOUS
Status: CANCELLED | OUTPATIENT
Start: 2024-06-10

## 2024-06-06 NOTE — PROGRESS NOTES
CC:  Persistent postmenopausal bleeding with chronic pain    HPI : Sameera Olivarez is a 54 y.o.   patient who presents for evaluation and discussion of treatment options for  persistent postmenopausal bleeding.  Patient reports persistent postmenopausal bleeding with significant discomfort during bleeding episodes.  Patient has longstanding history of chronic pelvic pain and severe dysmenorrhea.  Patient is menopausal on HRT with regular heavy bleeding similar to her reported menstrual cycles in past. Patient reports that this pain/discomfort is affecting her quality life.   Patient desires to remain on HRT and is considering definitive surgical management with a minimally invasive hysterectomy/bilateral salpingo-oophorectomy.  Patient with excellent workup from her primary gynecologist (Dr. Radha Patterson).     LMP:  Menopausal ().  Continued/persistent postmenopausal bleeding on HRT noted    Chart reviewed    Past Medical History:   Diagnosis Date    Anemia     borderline    Encounter for blood transfusion     16 yrs ago    GERD (gastroesophageal reflux disease)     Sjogren's syndrome      Past Surgical History:   Procedure Laterality Date    BELT ABDOMINOPLASTY       SECTION      COLONOSCOPY N/A 2019    Procedure: COLONOSCOPY;  Surgeon: Pete Soler MD;  Location: [address]);  Service: Endoscopy;  Laterality: N/A;    ECTOPIC PREGNANCY SURGERY      right unsure if tube removed    REPAIR OF COLLATERAL LIGAMENT OF THUMB Right 2022    Procedure: REPAIR, LIGAMENT, COLLATERAL, THUMB - right thumb ucl need arthrex;  Surgeon: Manuel Baltazar MD;  Location: [facility];  Service: Orthopedics;  Laterality: Right;    TOTAL REDUCTION MAMMOPLASTY      TUBAL LIGATION       Family History   Problem Relation Name Age of Onset    Sjogren's syndrome Paternal Grandfather      Achalasia Paternal Grandmother      Cerebral aneurysm Maternal Grandmother      Heart disease  "Maternal Grandfather  52    Heart disease Father  60    Breast cancer Mother  72    Breast cancer Maternal Aunt  65    Ovarian cancer Maternal Aunt  60    Heart disease Maternal Uncle      Lung cancer Maternal Uncle          smoker    Prostate cancer Maternal Uncle      Heart disease Paternal Aunt      Melanoma Neg Hx      Diabetes Neg Hx      Hypertension Neg Hx      Colon cancer Neg Hx      Cancer Neg Hx       Social History     Tobacco Use    Smoking status: Former    Smokeless tobacco: Former    Tobacco comments:     quit 20 years ago   Substance Use Topics    Alcohol use: Yes     Alcohol/week: 2.0 standard drinks of alcohol     Types: 2 Glasses of wine per week     Comment: weekends    Drug use: No     OB History    Para Term  AB Living   3 2 2   1 2   SAB IAB Ectopic Multiple Live Births       1   2      # Outcome Date GA Lbr Damian/2nd Weight Sex Type Anes PTL Lv   3 Term      CS-LTranv   LETY   2 Term      Vag-Spont   LETY   1 Ectopic                /78   Ht 5' 3" (1.6 m)   Wt 73 kg (160 lb 15 oz)   LMP 2022   BMI 28.51 kg/m²     ROS:  GENERAL: Feeling well overall.   SKIN: Denies rash or lesions.   HEAD: Denies head injury or headache.   NODES: Denies enlarged lymph nodes.   CHEST: Denies chest pain or shortness of breath.   CARDIOVASCULAR: Denies palpitations or left sided chest pain.   ABDOMEN: No abdominal pain, constipation, diarrhea, nausea, vomiting or rectal bleeding.   URINARY: No frequency, dysuria, hematuria, or burning on urination.  REPRODUCTIVE: See HPI.   BREASTS: Denies pain, lumps, or nipple discharge.   HEMATOLOGIC: No easy bruisability.  MUSCULOSKELETAL: Denies joint pain or swelling.   NEUROLOGIC: Denies syncope or weakness.   PSYCHIATRIC: Denies depression, anxiety or mood swings.      PHYSICAL EXAM:  APPEARANCE: Well nourished, well developed, in no acute distress.  AFFECT: WNL, alert and oriented x 3  SKIN: No acne or hirsutism  NECK: Neck symmetric " without masses or thyromegaly  NODES: No inguinal, cervical, axillary, or femoral lymph node enlargement  CHEST: Good respiratory effect  ABDOMEN: Soft.  No tenderness or masses.  No hepatosplenomegaly.  No hernias.  No peritoneal signs.  Abdominoplasty scar noted.  PELVIC: Normal external genitalia without lesions.  Normal hair distribution.  Adequate perineal body, normal urethral meatus.  Vagina with rugae and without lesions or discharge.  Cervix pink, without lesions, discharge or tenderness.  No significant cystocele or rectocele.  Bimanual exam shows uterus to be enlarged - 10-12 weeks size, regular, mobile and nontender.  Adnexa without masses but mild discomfort noted to bimanual exam (bilaterally).  EXTREMITIES: No edema.    ASSESSMENT & PLAN:  1. Adenomyosis of uterus    2. Post-menopausal bleeding    3. Bulky or enlarged uterus    4. Chronic pelvic pain in female    I have discussed the risks, benefits, indications, and alternatives of the procedure in detail.  The patient verbalizes her understanding.  All questions answered.  Consents signed.  The patient agrees to proceed to surgery scheduling.    Plan:  Robotic assisted hysterectomy with bilateral salpingo-oophorectomy.    Patient instructed to discontinue HRT 4 days prior to surgery.    Baltazar Stack IV, MD

## 2024-06-06 NOTE — H&P (VIEW-ONLY)
CC:  Persistent postmenopausal bleeding with chronic pain    HPI : Sameera Olivarez is a 54 y.o.   patient who presents for evaluation and discussion of treatment options for  persistent postmenopausal bleeding.  Patient reports persistent postmenopausal bleeding with significant discomfort during bleeding episodes.  Patient has longstanding history of chronic pelvic pain and severe dysmenorrhea.  Patient is menopausal on HRT with regular heavy bleeding similar to her reported menstrual cycles in past. Patient reports that this pain/discomfort is affecting her quality life.   Patient desires to remain on HRT and is considering definitive surgical management with a minimally invasive hysterectomy/bilateral salpingo-oophorectomy.  Patient with excellent workup from her primary gynecologist (Dr. Radha Patterson).     LMP:  Menopausal ().  Continued/persistent postmenopausal bleeding on HRT noted    Chart reviewed    Past Medical History:   Diagnosis Date    Anemia     borderline    Encounter for blood transfusion     16 yrs ago    GERD (gastroesophageal reflux disease)     Sjogren's syndrome      Past Surgical History:   Procedure Laterality Date    BELT ABDOMINOPLASTY       SECTION      COLONOSCOPY N/A 2019    Procedure: COLONOSCOPY;  Surgeon: Pete Soler MD;  Location: [address]);  Service: Endoscopy;  Laterality: N/A;    ECTOPIC PREGNANCY SURGERY      right unsure if tube removed    REPAIR OF COLLATERAL LIGAMENT OF THUMB Right 2022    Procedure: REPAIR, LIGAMENT, COLLATERAL, THUMB - right thumb ucl need arthrex;  Surgeon: Manuel Baltazar MD;  Location: [facility];  Service: Orthopedics;  Laterality: Right;    TOTAL REDUCTION MAMMOPLASTY      TUBAL LIGATION       Family History   Problem Relation Name Age of Onset    Sjogren's syndrome Paternal Grandfather      Achalasia Paternal Grandmother      Cerebral aneurysm Maternal Grandmother      Heart disease  "Maternal Grandfather  52    Heart disease Father  60    Breast cancer Mother  72    Breast cancer Maternal Aunt  65    Ovarian cancer Maternal Aunt  60    Heart disease Maternal Uncle      Lung cancer Maternal Uncle          smoker    Prostate cancer Maternal Uncle      Heart disease Paternal Aunt      Melanoma Neg Hx      Diabetes Neg Hx      Hypertension Neg Hx      Colon cancer Neg Hx      Cancer Neg Hx       Social History     Tobacco Use    Smoking status: Former    Smokeless tobacco: Former    Tobacco comments:     quit 20 years ago   Substance Use Topics    Alcohol use: Yes     Alcohol/week: 2.0 standard drinks of alcohol     Types: 2 Glasses of wine per week     Comment: weekends    Drug use: No     OB History    Para Term  AB Living   3 2 2   1 2   SAB IAB Ectopic Multiple Live Births       1   2      # Outcome Date GA Lbr Damian/2nd Weight Sex Type Anes PTL Lv   3 Term      CS-LTranv   LETY   2 Term      Vag-Spont   LETY   1 Ectopic                /78   Ht 5' 3" (1.6 m)   Wt 73 kg (160 lb 15 oz)   LMP 2022   BMI 28.51 kg/m²     ROS:  GENERAL: Feeling well overall.   SKIN: Denies rash or lesions.   HEAD: Denies head injury or headache.   NODES: Denies enlarged lymph nodes.   CHEST: Denies chest pain or shortness of breath.   CARDIOVASCULAR: Denies palpitations or left sided chest pain.   ABDOMEN: No abdominal pain, constipation, diarrhea, nausea, vomiting or rectal bleeding.   URINARY: No frequency, dysuria, hematuria, or burning on urination.  REPRODUCTIVE: See HPI.   BREASTS: Denies pain, lumps, or nipple discharge.   HEMATOLOGIC: No easy bruisability.  MUSCULOSKELETAL: Denies joint pain or swelling.   NEUROLOGIC: Denies syncope or weakness.   PSYCHIATRIC: Denies depression, anxiety or mood swings.      PHYSICAL EXAM:  APPEARANCE: Well nourished, well developed, in no acute distress.  AFFECT: WNL, alert and oriented x 3  SKIN: No acne or hirsutism  NECK: Neck symmetric " without masses or thyromegaly  NODES: No inguinal, cervical, axillary, or femoral lymph node enlargement  CHEST: Good respiratory effect  ABDOMEN: Soft.  No tenderness or masses.  No hepatosplenomegaly.  No hernias.  No peritoneal signs.  Abdominoplasty scar noted.  PELVIC: Normal external genitalia without lesions.  Normal hair distribution.  Adequate perineal body, normal urethral meatus.  Vagina with rugae and without lesions or discharge.  Cervix pink, without lesions, discharge or tenderness.  No significant cystocele or rectocele.  Bimanual exam shows uterus to be enlarged - 10-12 weeks size, regular, mobile and nontender.  Adnexa without masses but mild discomfort noted to bimanual exam (bilaterally).  EXTREMITIES: No edema.    ASSESSMENT & PLAN:  1. Adenomyosis of uterus    2. Post-menopausal bleeding    3. Bulky or enlarged uterus    4. Chronic pelvic pain in female    I have discussed the risks, benefits, indications, and alternatives of the procedure in detail.  The patient verbalizes her understanding.  All questions answered.  Consents signed.  The patient agrees to proceed to surgery scheduling.    Plan:  Robotic assisted hysterectomy with bilateral salpingo-oophorectomy.    Patient instructed to discontinue HRT 4 days prior to surgery.    Baltazar Stack IV, MD

## 2024-06-06 NOTE — DISCHARGE INSTRUCTIONS
Information to Prepare you for your Surgery    PRE-ADMIT TESTING -  [phone]    [address]  [facility]          Your surgery has been scheduled at [facility]. We are pleased to have the opportunity to serve you. For Further Information please call [phone].    On the day of surgery please report to the Information Desk on the 1st floor.    CONTACT YOUR PHYSICIAN'S OFFICE THE DAY PRIOR TO YOUR SURGERY TO OBTAIN YOUR ARRIVAL TIME.     The evening before surgery do not eat anything after 9 p.m. ( this includes hard candy, chewing gum and mints).  You may only have GATORADE, POWERADE AND WATER  from 9 p.m. until you leave your home.   DO NOT DRINK ANY LIQUIDS ON THE WAY TO THE HOSPITAL.      Why does your anesthesiologist allow you to drink Gatorade/Powerade before surgery?  Gatorade/Powerade helps to increase your comfort before surgery and to decrease your nausea after surgery. The carbohydrates in Gatorade/Powerade help reduce your body's stress response to surgery.  If you are a diabetic-drink only water prior to surgery.    Outpatient Surgery- May allow 2 adult (18 and older) Support Persons (1 being the designated ) for all surgical/procedural patients. A breastfeeding mother will be allowed her infant and 2 adult Support Persons. No one under the age of 18 will be allowed in the building. No swapping out of visitors in the [facility].      SPECIAL MEDICATION INSTRUCTIONS: TAKE medications checked off by the Anesthesiologist on your Medication List.    Angiogram Patients: Take medications as instructed by your physician, including aspirin.     Surgery Patients:    If you take ASPIRIN - Your PHYSICIAN/SURGEON will need to inform you IF/OR when you need to stop taking aspirin prior to your surgery.     The week prior to surgery do not ot take any medications containing IBUPROFEN or NSAIDS ( Advil, Motrin, Goodys, BC, Aleve, Naproxen etc)  If you are not sure if you should take a medicine please call your surgeon's office.  Ok to take Tylenol    Do Not Wear any make-up (especially eye make-up) to surgery. Please remove any false eyelashes or eyelash extensions. If you arrive the day of surgery with makeup/eyelashes on you will be required to remove prior to surgery. (There is a risk of corneal abrasions if eye makeup/eyelash extensions are not removed)      Leave all valuables at home.   Do Not wear any jewelry or watches, including any metal in body piercings. Jewelry must be removed prior to coming to the hospital.  There is a possibility that rings that are unable to be removed may be cut off if they are on the surgical extremity.    Please remove all hair extensions, wigs, clips and any other metal accessories/ ornaments from your hair.  These items may pose a flammable/fire risk in Surgery and must be removed.    Do not shave your surgical area at least 5 days prior to your surgery. The surgical prep will be performed at the hospital according to Infection Control regulations.    Contact Lens must be removed before surgery. Either do not wear the contact lens or bring a case and solution for storage.  Please bring a container for eyeglasses or dentures as required.  Bring any paperwork your physician has provided, such as consent forms,  history and physicals, doctor's orders, etc.   Bring comfortable clothes that are loose fitting to wear upon discharge. Take into consideration the type of surgery being performed.  Maintain your diet as advised per your physician the day prior to surgery.      Adequate rest the night before surgery is advised.   Park in the Parking lot behind the hospital or in the [facility] across the street from the parking lot. Parking is complimentary.  If you will be discharged the same day as your procedure, please arrange for a responsible adult to drive you home or to accompany you if traveling by taxi.    YOU WILL NOT BE PERMITTED TO DRIVE OR TO LEAVE THE HOSPITAL ALONE AFTER SURGERY.   If you are being discharged the same day, it is strongly recommended that you arrange for someone to remain with you for the first 24 hrs following your surgery.    The Surgeon will speak to your family/visitor after your surgery regarding the outcome of your surgery and post op care.  The Surgeon may speak to you after your surgery, but there is a possibility you may not remember the details.  Please check with your family members regarding the conversation with the Surgeon.    We strongly recommend whoever is bringing you home be present for discharge instructions.  This will ensure a thorough understanding for your post op home care.          Thank you for your cooperation.  The Staff of [facility].            Bathing Instructions with Hibiclens    Shower the evening before and morning of your procedure with Chlorhexidine (Hibiclens)  do not use Chlorhexidine on your face or genitals. Do not get in your eyes.  Wash your face with water and your regular face wash/soap  Use your regular shampoo  Apply Chlorhexidine (Hibiclens) directly on your skin or on a wet washcloth and wash gently. When showering: Move away from the shower stream when applying Chlorhexidine (Hibiclens) to avoid rinsing off too soon.  Rinse thoroughly with warm water  Do not dilute Chlorhexidine (Hibiclens)   Dry off as usual, do not use any deodorant, powder, body lotions, perfume, after shave or cologne.

## 2024-06-06 NOTE — ANESTHESIA PREPROCEDURE EVALUATION
06/06/2024  Sameera Olivarez is a 54 y.o., female.      Pre-op Assessment    I have reviewed the Patient Summary Reports.     I have reviewed the Nursing Notes. I have reviewed the NPO Status.   I have reviewed the Medications.     Review of Systems  Anesthesia Hx:  No problems with previous Anesthesia             Denies Family Hx of Anesthesia complications.    Denies Personal Hx of Anesthesia complications.                    Social:  Non-Smoker       Hematology/Oncology:  Hematology Normal   Oncology Normal                                   EENT/Dental:  EENT/Dental Normal           Cardiovascular:  Cardiovascular Normal                                            Pulmonary:  Pulmonary Normal                       Renal/:  Renal/ Normal                 Hepatic/GI:     GERD             Musculoskeletal:  Musculoskeletal Normal                Neurological:  Neurology Normal                                      Endocrine:  Endocrine Normal            Dermatological:  Skin Normal    Psych:  Psychiatric Normal                  Physical Exam  General: Well nourished and Alert    Airway:  Mallampati: II   Mouth Opening: Normal  Tongue: Normal    Dental:  Intact      Anesthesia Plan  Type of Anesthesia, risks & benefits discussed:    Anesthesia Type: Gen ETT  Intra-op Monitoring Plan: Standard ASA Monitors  Post Op Pain Control Plan: multimodal analgesia  Induction:  IV  Airway Plan: Video  Informed Consent: Informed consent signed with the Patient and all parties understand the risks and agree with anesthesia plan.  All questions answered.   ASA Score: 2  Anesthesia Plan Notes: [facility]  Labs pending  Last GLP-1 agonist taken one month ago    Ready For Surgery From Anesthesia Perspective.     .

## 2024-06-10 ENCOUNTER — ANESTHESIA (OUTPATIENT)
Dept: SURGERY | Facility: OTHER | Age: 54
End: 2024-06-10
Payer: COMMERCIAL

## 2024-06-10 ENCOUNTER — HOSPITAL ENCOUNTER (OUTPATIENT)
Facility: OTHER | Age: 54
Discharge: HOME | End: 2024-06-10
Attending: OBSTETRICS & GYNECOLOGY | Admitting: OBSTETRICS & GYNECOLOGY
Payer: COMMERCIAL

## 2024-06-10 DIAGNOSIS — Z90.710 STATUS POST HYSTERECTOMY: Primary | ICD-10-CM

## 2024-06-10 DIAGNOSIS — N80.03 ADENOMYOSIS OF UTERUS: ICD-10-CM

## 2024-06-10 LAB
B-HCG UR QL: NEGATIVE
CTP QC/QA: YES
POCT GLUCOSE: 95 MG/DL (ref 70–110)

## 2024-06-10 PROCEDURE — 36000712 HC OR TIME LEV V 1ST 15 MIN: Performed by: OBSTETRICS & GYNECOLOGY

## 2024-06-10 PROCEDURE — 63600175 PHARM REV CODE 636 W HCPCS: Performed by: NURSE ANESTHETIST, CERTIFIED REGISTERED

## 2024-06-10 PROCEDURE — 88342 IMHCHEM/IMCYTCHM 1ST ANTB: CPT | Mod: 26,,, | Performed by: PATHOLOGY

## 2024-06-10 PROCEDURE — 25000003 PHARM REV CODE 250: Performed by: ANESTHESIOLOGY

## 2024-06-10 PROCEDURE — 27201423 OPTIME MED/SURG SUP & DEVICES STERILE SUPPLY: Performed by: OBSTETRICS & GYNECOLOGY

## 2024-06-10 PROCEDURE — 37000008 HC ANESTHESIA 1ST 15 MINUTES: Performed by: OBSTETRICS & GYNECOLOGY

## 2024-06-10 PROCEDURE — 88307 TISSUE EXAM BY PATHOLOGIST: CPT | Performed by: PATHOLOGY

## 2024-06-10 PROCEDURE — 37000009 HC ANESTHESIA EA ADD 15 MINS: Performed by: OBSTETRICS & GYNECOLOGY

## 2024-06-10 PROCEDURE — 71000015 HC POSTOP RECOV 1ST HR: Performed by: OBSTETRICS & GYNECOLOGY

## 2024-06-10 PROCEDURE — 25000003 PHARM REV CODE 250: Performed by: NURSE ANESTHETIST, CERTIFIED REGISTERED

## 2024-06-10 PROCEDURE — 82962 GLUCOSE BLOOD TEST: CPT | Performed by: OBSTETRICS & GYNECOLOGY

## 2024-06-10 PROCEDURE — 25000003 PHARM REV CODE 250: Performed by: OBSTETRICS & GYNECOLOGY

## 2024-06-10 PROCEDURE — 63600175 PHARM REV CODE 636 W HCPCS: Performed by: OBSTETRICS & GYNECOLOGY

## 2024-06-10 PROCEDURE — 58571 TLH W/T/O 250 G OR LESS: CPT | Mod: 22,,, | Performed by: OBSTETRICS & GYNECOLOGY

## 2024-06-10 PROCEDURE — 63600175 PHARM REV CODE 636 W HCPCS: Performed by: ANESTHESIOLOGY

## 2024-06-10 PROCEDURE — 88342 IMHCHEM/IMCYTCHM 1ST ANTB: CPT | Performed by: PATHOLOGY

## 2024-06-10 PROCEDURE — 58571 TLH W/T/O 250 G OR LESS: CPT | Mod: 22,AS,, | Performed by: NURSE PRACTITIONER

## 2024-06-10 PROCEDURE — 71000016 HC POSTOP RECOV ADDL HR: Performed by: OBSTETRICS & GYNECOLOGY

## 2024-06-10 PROCEDURE — 81025 URINE PREGNANCY TEST: CPT

## 2024-06-10 PROCEDURE — 36000713 HC OR TIME LEV V EA ADD 15 MIN: Performed by: OBSTETRICS & GYNECOLOGY

## 2024-06-10 PROCEDURE — 88307 TISSUE EXAM BY PATHOLOGIST: CPT | Mod: 26,,, | Performed by: PATHOLOGY

## 2024-06-10 PROCEDURE — 71000033 HC RECOVERY, INTIAL HOUR: Performed by: OBSTETRICS & GYNECOLOGY

## 2024-06-10 RX ORDER — HYDROMORPHONE HYDROCHLORIDE 2 MG/ML
0.4 INJECTION, SOLUTION INTRAMUSCULAR; INTRAVENOUS; SUBCUTANEOUS EVERY 5 MIN PRN
Status: DISCONTINUED | OUTPATIENT
Start: 2024-06-10 | End: 2024-06-10 | Stop reason: HOSPADM

## 2024-06-10 RX ORDER — IBUPROFEN 600 MG/1
600 TABLET ORAL EVERY 6 HOURS PRN
Qty: 60 TABLET | Refills: 0 | Status: SHIPPED | OUTPATIENT
Start: 2024-06-10

## 2024-06-10 RX ORDER — FENTANYL CITRATE 50 UG/ML
INJECTION, SOLUTION INTRAMUSCULAR; INTRAVENOUS
Status: DISCONTINUED | OUTPATIENT
Start: 2024-06-10 | End: 2024-06-10

## 2024-06-10 RX ORDER — ACETAMINOPHEN 500 MG
1000 TABLET ORAL
Status: DISCONTINUED | OUTPATIENT
Start: 2024-06-10 | End: 2024-06-10 | Stop reason: HOSPADM

## 2024-06-10 RX ORDER — ACETAMINOPHEN 500 MG
1000 TABLET ORAL
Status: COMPLETED | OUTPATIENT
Start: 2024-06-10 | End: 2024-06-10

## 2024-06-10 RX ORDER — PROPOFOL 10 MG/ML
VIAL (ML) INTRAVENOUS
Status: DISCONTINUED | OUTPATIENT
Start: 2024-06-10 | End: 2024-06-10

## 2024-06-10 RX ORDER — DEXTROMETHORPHAN HYDROBROMIDE, GUAIFENESIN 5; 100 MG/5ML; MG/5ML
650 LIQUID ORAL EVERY 6 HOURS PRN
Qty: 60 TABLET | Refills: 0 | Status: SHIPPED | OUTPATIENT
Start: 2024-06-10

## 2024-06-10 RX ORDER — PROCHLORPERAZINE EDISYLATE 5 MG/ML
5 INJECTION INTRAMUSCULAR; INTRAVENOUS EVERY 30 MIN PRN
Status: DISCONTINUED | OUTPATIENT
Start: 2024-06-10 | End: 2024-06-10 | Stop reason: HOSPADM

## 2024-06-10 RX ORDER — KETAMINE HCL IN 0.9 % NACL 50 MG/5 ML
SYRINGE (ML) INTRAVENOUS
Status: DISCONTINUED | OUTPATIENT
Start: 2024-06-10 | End: 2024-06-10

## 2024-06-10 RX ORDER — DIPHENHYDRAMINE HYDROCHLORIDE 50 MG/ML
12.5 INJECTION INTRAMUSCULAR; INTRAVENOUS EVERY 30 MIN PRN
Status: DISCONTINUED | OUTPATIENT
Start: 2024-06-10 | End: 2024-06-10 | Stop reason: HOSPADM

## 2024-06-10 RX ORDER — AMOXICILLIN 250 MG
1 CAPSULE ORAL DAILY
Qty: 30 TABLET | Refills: 0 | Status: SHIPPED | OUTPATIENT
Start: 2024-06-10

## 2024-06-10 RX ORDER — HYDROMORPHONE HYDROCHLORIDE 2 MG/ML
INJECTION, SOLUTION INTRAMUSCULAR; INTRAVENOUS; SUBCUTANEOUS
Status: DISCONTINUED | OUTPATIENT
Start: 2024-06-10 | End: 2024-06-10

## 2024-06-10 RX ORDER — OXYCODONE HYDROCHLORIDE 5 MG/1
5 TABLET ORAL ONCE
Status: COMPLETED | OUTPATIENT
Start: 2024-06-10 | End: 2024-06-10

## 2024-06-10 RX ORDER — SODIUM CHLORIDE, SODIUM LACTATE, POTASSIUM CHLORIDE, CALCIUM CHLORIDE 600; 310; 30; 20 MG/100ML; MG/100ML; MG/100ML; MG/100ML
INJECTION, SOLUTION INTRAVENOUS CONTINUOUS
Status: DISCONTINUED | OUTPATIENT
Start: 2024-06-10 | End: 2024-06-10 | Stop reason: HOSPADM

## 2024-06-10 RX ORDER — PREGABALIN 75 MG/1
75 CAPSULE ORAL ONCE
Status: COMPLETED | OUTPATIENT
Start: 2024-06-10 | End: 2024-06-10

## 2024-06-10 RX ORDER — FAMOTIDINE 20 MG/1
20 TABLET, FILM COATED ORAL
Status: COMPLETED | OUTPATIENT
Start: 2024-06-10 | End: 2024-06-10

## 2024-06-10 RX ORDER — VECURONIUM BROMIDE FOR INJECTION 1 MG/ML
INJECTION, POWDER, LYOPHILIZED, FOR SOLUTION INTRAVENOUS
Status: DISCONTINUED | OUTPATIENT
Start: 2024-06-10 | End: 2024-06-10

## 2024-06-10 RX ORDER — KETOROLAC TROMETHAMINE 30 MG/ML
INJECTION, SOLUTION INTRAMUSCULAR; INTRAVENOUS
Status: DISCONTINUED | OUTPATIENT
Start: 2024-06-10 | End: 2024-06-10

## 2024-06-10 RX ORDER — PHENYLEPHRINE HYDROCHLORIDE 10 MG/ML
INJECTION INTRAVENOUS
Status: DISCONTINUED | OUTPATIENT
Start: 2024-06-10 | End: 2024-06-10

## 2024-06-10 RX ORDER — OXYCODONE HYDROCHLORIDE 5 MG/1
5 TABLET ORAL EVERY 4 HOURS PRN
Qty: 10 TABLET | Refills: 0 | Status: SHIPPED | OUTPATIENT
Start: 2024-06-10

## 2024-06-10 RX ORDER — ONDANSETRON HYDROCHLORIDE 2 MG/ML
INJECTION, SOLUTION INTRAVENOUS
Status: DISCONTINUED | OUTPATIENT
Start: 2024-06-10 | End: 2024-06-10

## 2024-06-10 RX ORDER — SODIUM CHLORIDE 0.9 % (FLUSH) 0.9 %
3 SYRINGE (ML) INJECTION
Status: DISCONTINUED | OUTPATIENT
Start: 2024-06-10 | End: 2024-06-10 | Stop reason: HOSPADM

## 2024-06-10 RX ORDER — SODIUM CHLORIDE 9 MG/ML
INJECTION, SOLUTION INTRAVENOUS CONTINUOUS
Status: DISCONTINUED | OUTPATIENT
Start: 2024-06-10 | End: 2024-06-10 | Stop reason: HOSPADM

## 2024-06-10 RX ORDER — ROCURONIUM BROMIDE 10 MG/ML
INJECTION, SOLUTION INTRAVENOUS
Status: DISCONTINUED | OUTPATIENT
Start: 2024-06-10 | End: 2024-06-10

## 2024-06-10 RX ORDER — LIDOCAINE HYDROCHLORIDE 10 MG/ML
0.5 INJECTION, SOLUTION EPIDURAL; INFILTRATION; INTRACAUDAL; PERINEURAL ONCE
Status: DISCONTINUED | OUTPATIENT
Start: 2024-06-10 | End: 2024-06-10 | Stop reason: HOSPADM

## 2024-06-10 RX ORDER — LIDOCAINE HYDROCHLORIDE 20 MG/ML
INJECTION INTRAVENOUS
Status: DISCONTINUED | OUTPATIENT
Start: 2024-06-10 | End: 2024-06-10

## 2024-06-10 RX ORDER — DIPHENHYDRAMINE HYDROCHLORIDE 50 MG/ML
INJECTION INTRAMUSCULAR; INTRAVENOUS
Status: DISCONTINUED | OUTPATIENT
Start: 2024-06-10 | End: 2024-06-10

## 2024-06-10 RX ORDER — MEPERIDINE HYDROCHLORIDE 25 MG/ML
12.5 INJECTION INTRAMUSCULAR; INTRAVENOUS; SUBCUTANEOUS ONCE AS NEEDED
Status: DISCONTINUED | OUTPATIENT
Start: 2024-06-10 | End: 2024-06-10 | Stop reason: HOSPADM

## 2024-06-10 RX ORDER — MIDAZOLAM HYDROCHLORIDE 1 MG/ML
INJECTION INTRAMUSCULAR; INTRAVENOUS
Status: DISCONTINUED | OUTPATIENT
Start: 2024-06-10 | End: 2024-06-10

## 2024-06-10 RX ORDER — OXYCODONE HYDROCHLORIDE 5 MG/1
5 TABLET ORAL EVERY 4 HOURS PRN
Status: DISCONTINUED | OUTPATIENT
Start: 2024-06-10 | End: 2024-06-10 | Stop reason: HOSPADM

## 2024-06-10 RX ORDER — DEXAMETHASONE SODIUM PHOSPHATE 4 MG/ML
INJECTION, SOLUTION INTRA-ARTICULAR; INTRALESIONAL; INTRAMUSCULAR; INTRAVENOUS; SOFT TISSUE
Status: DISCONTINUED | OUTPATIENT
Start: 2024-06-10 | End: 2024-06-10

## 2024-06-10 RX ADMIN — SODIUM CHLORIDE, SODIUM LACTATE, POTASSIUM CHLORIDE, AND CALCIUM CHLORIDE: 600; 310; 30; 20 INJECTION, SOLUTION INTRAVENOUS at 10:06

## 2024-06-10 RX ADMIN — ONDANSETRON 4 MG: 2 INJECTION INTRAMUSCULAR; INTRAVENOUS at 12:06

## 2024-06-10 RX ADMIN — VECURONIUM BROMIDE FOR INJECTION 3 MG: 1 INJECTION, POWDER, LYOPHILIZED, FOR SOLUTION INTRAVENOUS at 11:06

## 2024-06-10 RX ADMIN — LIDOCAINE HYDROCHLORIDE 100 MG: 20 INJECTION, SOLUTION INTRAVENOUS at 10:06

## 2024-06-10 RX ADMIN — CARBOXYMETHYLCELLULOSE SODIUM 4 DROP: 2.5 SOLUTION/ DROPS OPHTHALMIC at 10:06

## 2024-06-10 RX ADMIN — OXYCODONE HYDROCHLORIDE 5 MG: 5 TABLET ORAL at 03:06

## 2024-06-10 RX ADMIN — FAMOTIDINE 20 MG: 20 TABLET ORAL at 08:06

## 2024-06-10 RX ADMIN — PREGABALIN 75 MG: 75 CAPSULE ORAL at 08:06

## 2024-06-10 RX ADMIN — KETOROLAC TROMETHAMINE 30 MG: 30 INJECTION, SOLUTION INTRAMUSCULAR; INTRAVENOUS at 12:06

## 2024-06-10 RX ADMIN — HYDROMORPHONE HYDROCHLORIDE 0.4 MG: 2 INJECTION INTRAMUSCULAR; INTRAVENOUS; SUBCUTANEOUS at 11:06

## 2024-06-10 RX ADMIN — Medication 10 MG: at 12:06

## 2024-06-10 RX ADMIN — PROPOFOL 200 MG: 10 INJECTION, EMULSION INTRAVENOUS at 10:06

## 2024-06-10 RX ADMIN — ROCURONIUM BROMIDE 50 MG: 10 SOLUTION INTRAVENOUS at 10:06

## 2024-06-10 RX ADMIN — MIDAZOLAM HYDROCHLORIDE 2 MG: 1 INJECTION INTRAMUSCULAR; INTRAVENOUS at 10:06

## 2024-06-10 RX ADMIN — ACETAMINOPHEN 1000 MG: 500 TABLET ORAL at 08:06

## 2024-06-10 RX ADMIN — DEXAMETHASONE SODIUM PHOSPHATE 8 MG: 4 INJECTION, SOLUTION INTRAMUSCULAR; INTRAVENOUS at 10:06

## 2024-06-10 RX ADMIN — Medication 30 MG: at 10:06

## 2024-06-10 RX ADMIN — SUGAMMADEX 200 MG: 100 INJECTION, SOLUTION INTRAVENOUS at 12:06

## 2024-06-10 RX ADMIN — DIPHENHYDRAMINE HYDROCHLORIDE 6.25 MG: 50 INJECTION, SOLUTION INTRAMUSCULAR; INTRAVENOUS at 10:06

## 2024-06-10 RX ADMIN — FENTANYL CITRATE 100 MCG: 50 INJECTION, SOLUTION INTRAMUSCULAR; INTRAVENOUS at 10:06

## 2024-06-10 RX ADMIN — PHENYLEPHRINE HYDROCHLORIDE 200 MCG: 10 INJECTION INTRAVENOUS at 10:06

## 2024-06-10 RX ADMIN — VECURONIUM BROMIDE FOR INJECTION 2 MG: 1 INJECTION, POWDER, LYOPHILIZED, FOR SOLUTION INTRAVENOUS at 12:06

## 2024-06-10 RX ADMIN — CEFAZOLIN 2 G: 2 INJECTION, POWDER, FOR SOLUTION INTRAMUSCULAR; INTRAVENOUS at 10:06

## 2024-06-10 RX ADMIN — Medication 10 MG: at 11:06

## 2024-06-10 NOTE — DISCHARGE SUMMARY
[facility]  Brief Op Note/Discharge Note  Short Stay    Admit Date: 6/10/2024    Discharge Date: 06/10/2024    Attending Physician: Baltazar Stack IV, MD     Surgery Date: 6/10/2024     Surgeons and Role:     * Baltazar Stack IV, MD - Primary     * Liseth Thakur MD - Resident - Assisting        Pre-op Diagnosis:  PMB (postmenopausal bleeding) [N95.0]  Chronic pelvic pain in female [R10.2, G89.29]  Severe dysmenorrhea [N94.6]    Post-op Diagnosis:  Post-Op Diagnosis Codes:     * PMB (postmenopausal bleeding) [N95.0]     * Chronic pelvic pain in female [R10.2, G89.29]     * Severe dysmenorrhea [N94.6]    Procedure(s) (LRB):  XI ROBOTIC HYSTERECTOMY (N/A)    Anesthesia: General    Findings/Key Components: See operative note for full findings.    Estimated Blood Loss: 50 mL         Specimens:   Specimen (24h ago, onward)       Start     Ordered    06/10/24 1151  Specimen to Pathology, Surgery Gynecology and Obstetrics  Once        Comments: Pre-op Diagnosis: PMB (postmenopausal bleeding) [N95.0]Chronic pelvic pain in female [R10.2, G89.29]Severe dysmenorrhea [N94.6]Procedure(s):XI ROBOTIC HYSTERECTOMY Number of specimens: 1Name of specimens: 1. Uterus, cervix, bilateral fallopian tubes, bilateral ovaries     References:    Click here for ordering Quick Tip   Question Answer Comment   Procedure Type: Gynecology and Obstetrics    Specimen Class: Routine/Screening    Release to patient Immediate        06/10/24 1153                    Discharge Provider: Melida Adhikari    Diagnoses:  Active Hospital Problems    Diagnosis  POA    *S/p RA-TLH/BSO [Z90.710]  No      Resolved Hospital Problems   No resolved problems to display.       Discharged Condition: good    Hospital Course:   Patient was admitted for outpatient procedure as above, and tolerated the procedure well with no complications. Please see operative report for further details. Following the procedure, the patient was awakened from anesthesia and  "transferred to the recovery area in stable condition. She was discharged to home once ambulating, voiding, tolerating PO intake, and pain was well-controlled. Patient was given routine post-op instructions and prescriptions for pain medication to take as needed. Patient instructed to follow up with Dr. Stack in 6 weeks.    Final Diagnoses: Same as principal problem.    Disposition: Home or Self Care    Follow up/Patient Instructions:    Medications:  Reconciled Home Medications:      Medication List        START taking these medications      acetaminophen 650 MG Tbsr  Commonly known as: TYLENOL  Take 1 tablet (650 mg total) by mouth every 6 (six) hours as needed (pain).     ibuprofen 600 MG tablet  Commonly known as: ADVIL,MOTRIN  Take 1 tablet (600 mg total) by mouth every 6 (six) hours as needed.     oxyCODONE 5 MG immediate release tablet  Commonly known as: ROXICODONE  Take 1 tablet (5 mg total) by mouth every 4 (four) hours as needed for Pain.     senna-docusate 8.6-50 mg 8.6-50 mg per tablet  Commonly known as: PERICOLACE  Take 1 tablet by mouth once daily.            CONTINUE taking these medications      BD LUER-JEFFREY SYRINGE 3 mL 22 gauge x 1" Syrg  Generic drug: syringe with needle  1 Syringe by Misc.(Non-Drug; Combo Route) route every 30 days.     * estradioL 0.01 % (0.1 mg/gram) vaginal cream  Commonly known as: ESTRACE  Insert 0.5 grams with applicator or dime-sized amount with finger in vagina nightly for  2 weeks, then twice a week thereafter     * estradioL 1.25 mg/1.25 gram (0.1 %) Glpk  Place 1.25 mg (1 packet) onto the skin Daily.     finasteride 1 mg tablet  Commonly known as: PROPECIA  Take 1 tablet (1 mg total) by mouth once daily.     hydroCHLOROthiazide 25 MG tablet  Commonly known as: HYDRODIURIL  Take 1 tablet (25 mg total) by mouth once daily.     metroNIDAZOLE 1 % cream  Commonly known as: NORITATE  Compound azelaic acid 15% + ivermectin 1% + metronidazole 1% cream. Apply to face once or " twice daily.     norethindrone 5 mg Tab  Commonly known as: AYGESTIN  Take 1 tablet (5 mg total) by mouth once daily. For 21 days     semaglutide (weight loss) 0.5 mg/0.5 mL Pnij  Inject 0.5 mg into the skin every 7 days.     testosterone cypionate 200 mg/mL injection  Commonly known as: DEPOTESTOTERONE CYPIONATE  Inject 0.25 mLs (50 mg total) into the muscle every 28 days.           * This list has 2 medication(s) that are the same as other medications prescribed for you. Read the directions carefully, and ask your doctor or other care provider to review them with you.                Discharge Procedure Orders   Diet general     Lifting restrictions   Order Comments: No lifting greater than 15 pounds for six weeks.     Other restrictions (specify):   Order Comments: PELVIC REST:  No douching, tampons, or intercourse for 6 weeks.    If prescribed, vaginal estrogen cream may be used during the postoperative period.     DRIVING:  No driving while on narcotics. Driving may be resumed initially with a competent passenger one to two weeks after surgery if no longer taking narcotics.     EXERCISE:  For six weeks your exercise should be limited to walking. You may walk as far as you wish, as long as you increase your level of exertion gradually and avoid slippery surfaces. You may climb stairs as needed to get around, but should not use stair climbing for exercise.     Remove dressing in 24 hours   Order Comments: If you have a bandage on wound, you may remove it the day after dismissal.  If you had steri-strips remove them once they begin to peel off (usually 2 weeks). Keep incision clean and dry.  Inspect the incision daily for signs and symptoms of infection.     Wound care routine (specify)   Order Comments: WOUND CARE:  If you have a band-aid or bandage on your wound, you may remove it the day after dismissal.  If you had steri-strips remove them once they begin to peel off (usually 2 weeks).  If your steri-strips  still haven't come off in 2 weeks, please remove them. You may wash the wound with mild soap and water.   You may shower at any time but should avoid immersing any abdominal incisions in water for at least two weeks after surgery or until the wound is completely healed. If given, please shower with Hibiclens soap until bottle is completely finished. Keep your wound clean and dry.  You should observe your incision for signs of infection which include redness, warmth, drainage or fever.     Call MD for:  temperature >100.4     Call MD for:  persistent nausea and vomiting     Call MD for:  severe uncontrolled pain     Call MD for:  difficulty breathing, headache or visual disturbances     Call MD for:  redness, tenderness, or signs of infection (pain, swelling, redness, odor or green/yellow discharge around incision site)     Call MD for:  hives     Call MD for:   Order Comments: inability to void,urine is ketchup colored or you have large clots, vaginal bleeding is heavier than a period.    VAGINAL DISCHARGE: You may develop a vaginal discharge and intermittent vaginal spotting after surgery and up to 6 weeks postoperatively.  The discharge may have an odor and may change in color but it is normal.  This is due to dissolving stiches.  Contact your surgical team if you develop vaginal or vulvar irritation along with a discharge.  Also contact your surgical team if you have vaginal discharge that smells like urine or stool.    CONSTIPATION REMEDIES: Patients are often constipated after surgery or with use of oral narcotic medicine. You should continue to take the stool softener, Senokot-S during the next six weeks, and consume adequate amounts of water.  If you have not had a bowel movement for 3 days after dismissal, or are uncomfortable and unable to pass stool, please try one or all of the following measures:  1.  Milk of Magnesia - 30 cc by mouth every 12 hours   2.  Dulcolax suppository - One suppository per rectum  every 4-6 hours   3.  Metamucil, Fibercon or other bulk former - use as directed  4.  Fleets Enema        PAIN MEDICATIONS:     Take your pain medications as instructed. It is best to take pain medications before your pain becomes severe. This will allow you to take less medication yet have better pain relief. For the first 2 or 3 days it may be helpful to take your pain medications on a regular schedule (e.g. every 4 to 6 hours). This will help you to keep your pain under better control. You should then begin to take fewer medications each day until you no longer need them. Do not take pain medication on an empty stomach. This may lead to nausea and vomiting.     Activity as tolerated   Order Comments: Return to normal activity slowly as you feel able.  For 6 weeks your exercise should be limited to walking.  You may walk as far as you wish, as long as you increase your level of exertion gradually and avoid slippery surfaces.    If you had a hysterectomy at the surgery do not insert anything in your vagina for 9 weeks.     Shower on day dressing removed (No bath)   Order Comments: You may shower at any time but should avoid immersing any abdominal incisions in water for at least 2 weeks after surgery or until the wound is completely healed.  If given, please shower with Hibaclens soap until bottle is completely finish         Melida Adhikari MD  OBGYN, PGY-3

## 2024-06-10 NOTE — TRANSFER OF CARE
"Anesthesia Transfer of Care Note    Patient: Sameera Olivarez    Procedure(s) Performed: Procedure(s) (LRB):  XI ROBOTIC HYSTERECTOMY (N/A)    Patient location: PACU    Anesthesia Type: general    Transport from OR: Transported from OR on 6-10 L/min O2 by face mask with adequate spontaneous ventilation    Post pain: adequate analgesia    Post assessment: no apparent anesthetic complications and tolerated procedure well    Post vital signs: stable    Level of consciousness: awake and alert    Nausea/Vomiting: no nausea/vomiting    Complications: none    Transfer of care protocol was followed      Last vitals: Visit Vitals  BP (!) 153/69 (BP Location: Right arm, Patient Position: Lying)   Pulse 89   Temp 36.3 °C (97.3 °F) (Skin)   Resp 16   Ht 5' 3" (1.6 m)   Wt 72.6 kg (160 lb)   LMP 05/06/2022   SpO2 100%   Breastfeeding No   BMI 28.34 kg/m²     "

## 2024-06-10 NOTE — ANESTHESIA PROCEDURE NOTES
Intubation    Date/Time: 6/10/2024 10:26 AM    Performed by: Jessi Grigsby CRNA  Authorized by: Laci Mcadams MD    Intubation:     Induction:  Intravenous    Intubated:  Postinduction    Mask Ventilation:  Easy mask    Attempts:  1    Attempted By:  CRNA    Method of Intubation:  Video laryngoscopy    Blade:  Marin 3    Laryngeal View Grade: Grade I - full view of cords      Difficult Airway Encountered?: No      Complications:  None    Airway Device:  Oral endotracheal tube    Airway Device Size:  7.5    Style/Cuff Inflation:  Cuffed    Inflation Amount (mL):  3    Tube secured:  22    Secured at:  The lips    Placement Verified By:  Capnometry    Complicating Factors:  None    Findings Post-Intubation:  BS equal bilateral

## 2024-06-10 NOTE — OPERATIVE NOTE ADDENDUM
Certification of Assistant at Surgery       Surgery Date: 6/10/2024     Participating Surgeons:  Surgeons and Role:     * Baltazar Stack IV, MD - Primary     * Liseth Thakur MD - Resident - Assisting    Procedures:  Procedure(s) (LRB):  XI ROBOTIC HYSTERECTOMY (N/A)    Assistant Surgeon's Certification of Necessity:  I understand that section 1842 (b) (6) (d) of the Social Security Act generally prohibits Medicare Part B reasonable charge payment for the services of assistants at surgery in teaching hospitals when qualified residents are available to furnish such services. I certify that the services for which payment is claimed were medically necessary, and that no qualified resident was available to perform the services. I further understand that these services are subject to post-payment review by the Medicare carrier.      MERCEDES Clemente    06/10/2024  1:01 PM

## 2024-06-10 NOTE — PLAN OF CARE
Sameera MOROCHO Olivarez has met all discharge criteria from Phase II. Vital Signs are stable, ambulating  without difficulty. Discharge instructions given, patient verbalized understanding. Discharged from facility via wheelchair in stable condition.

## 2024-06-10 NOTE — ANESTHESIA POSTPROCEDURE EVALUATION
Anesthesia Post Evaluation    Patient: Sameera Olivarez    Procedure(s) Performed: Procedure(s) (LRB):  XI ROBOTIC HYSTERECTOMY (N/A)    Final Anesthesia Type: general      Patient location during evaluation: PACU  Patient participation: Yes- Able to Participate  Level of consciousness: awake and alert  Post-procedure vital signs: reviewed and stable  Pain management: adequate  Airway patency: patent    PONV status at discharge: No PONV  Anesthetic complications: no      Cardiovascular status: blood pressure returned to baseline  Respiratory status: spontaneous ventilation  Hydration status: euvolemic  Follow-up not needed.          Vitals Value Taken Time   /71 06/10/24 1331   Temp 36.3 °C (97.3 °F) 06/10/24 1254   Pulse 75 06/10/24 1337   Resp 16 06/10/24 1330   SpO2 95 % 06/10/24 1337   Vitals shown include unfiled device data.      Event Time   Out of Recovery 13:42:42         Pain/Christine Score: Pain Rating Prior to Med Admin: 0 (6/10/2024  8:42 AM)  Christine Score: 10 (6/10/2024  1:36 PM)

## 2024-06-11 ENCOUNTER — PATIENT MESSAGE (OUTPATIENT)
Dept: OBSTETRICS AND GYNECOLOGY | Facility: CLINIC | Age: 54
End: 2024-06-11
Payer: COMMERCIAL

## 2024-06-11 VITALS
SYSTOLIC BLOOD PRESSURE: 130 MMHG | TEMPERATURE: 99 F | RESPIRATION RATE: 18 BRPM | BODY MASS INDEX: 28.35 KG/M2 | HEIGHT: 63 IN | HEART RATE: 66 BPM | DIASTOLIC BLOOD PRESSURE: 68 MMHG | WEIGHT: 160 LBS | OXYGEN SATURATION: 99 %

## 2024-06-12 ENCOUNTER — TELEPHONE (OUTPATIENT)
Dept: OBSTETRICS AND GYNECOLOGY | Facility: CLINIC | Age: 54
End: 2024-06-12
Payer: COMMERCIAL

## 2024-06-12 NOTE — TELEPHONE ENCOUNTER
Spoke with pt. Patient states doing well,reports passing gas and BM. able to control discomforts at home with medications.  Instructed patient to contact the office with any questions or concerns.       Wants to know if she can restart estradiol, will speak with Dr. Sreekanth SHOOK on Thursday  Patient verbalized understanding

## 2024-06-12 NOTE — TELEPHONE ENCOUNTER
----- Message from Reji Villeda sent at 6/12/2024  4:28 PM CDT -----      Name of Who is Calling: NOE ARCHER [[id number]]      What is the request in detail: Pt returned call to the office.Please contact to further discuss and advise.          Can the clinic reply by [facility]: Y      [facility]  [phone]   Goal Outcome Evaluation:    Plan of Care Reviewed With: patient     Overall Patient Progress: no change       Pt with more intermittent agitation and restlessness today. Able to follow simple commands periodically. Restarted propofol due to ongoing agitation with HTN, tachycardia. Remains in afib, rate 100-120. PST for 2hrs x2 today, fair tolerance. Up to chair x2. RVAD weaned to 1.8L per cards. Low urine output. Chest tubes with minimal serosanguinous drainage.

## 2024-06-13 LAB
FINAL PATHOLOGIC DIAGNOSIS: NORMAL
GROSS: NORMAL
Lab: NORMAL
MICROSCOPIC EXAM: NORMAL

## 2024-06-13 NOTE — TELEPHONE ENCOUNTER
Spoke w/ Dr. Stack IV, informed as long as patient is moving around and feeling good, she can restart estradiol    Called patient and informed  Pt verbalized understanding

## 2024-06-25 ENCOUNTER — PATIENT MESSAGE (OUTPATIENT)
Dept: OBSTETRICS AND GYNECOLOGY | Facility: CLINIC | Age: 54
End: 2024-06-25
Payer: COMMERCIAL

## 2024-06-25 DIAGNOSIS — L65.9 HAIR LOSS: ICD-10-CM

## 2024-06-25 RX ORDER — FINASTERIDE 1 MG/1
1 TABLET, FILM COATED ORAL DAILY
Qty: 90 TABLET | Refills: 2 | Status: SHIPPED | OUTPATIENT
Start: 2024-06-25

## 2024-06-26 DIAGNOSIS — N95.1 MENOPAUSAL SYMPTOMS: Primary | ICD-10-CM

## 2024-06-26 RX ORDER — ESTRADIOL 0.07 MG/D
1 FILM, EXTENDED RELEASE TRANSDERMAL
Qty: 8 PATCH | Refills: 11 | Status: SHIPPED | OUTPATIENT
Start: 2024-06-27 | End: 2025-06-27

## 2024-07-03 ENCOUNTER — PATIENT MESSAGE (OUTPATIENT)
Dept: OBSTETRICS AND GYNECOLOGY | Facility: CLINIC | Age: 54
End: 2024-07-03
Payer: COMMERCIAL

## 2024-07-10 ENCOUNTER — PATIENT MESSAGE (OUTPATIENT)
Dept: INTERNAL MEDICINE | Facility: CLINIC | Age: 54
End: 2024-07-10

## 2024-07-10 ENCOUNTER — OFFICE VISIT (OUTPATIENT)
Dept: INTERNAL MEDICINE | Facility: CLINIC | Age: 54
End: 2024-07-10
Payer: COMMERCIAL

## 2024-07-10 DIAGNOSIS — E66.3 OVERWEIGHT WITH BODY MASS INDEX (BMI) OF 27 TO 27.9 IN ADULT: ICD-10-CM

## 2024-07-10 DIAGNOSIS — E66.9 OBESITY, UNSPECIFIED CLASSIFICATION, UNSPECIFIED OBESITY TYPE, UNSPECIFIED WHETHER SERIOUS COMORBIDITY PRESENT: Primary | ICD-10-CM

## 2024-07-10 PROCEDURE — 99499 UNLISTED E&M SERVICE: CPT | Mod: 95,,,

## 2024-07-10 RX ORDER — TIRZEPATIDE 5 MG/.5ML
5 INJECTION, SOLUTION SUBCUTANEOUS
Qty: 2 ML | Refills: 2 | Status: ACTIVE | OUTPATIENT
Start: 2024-07-10

## 2024-07-10 NOTE — PATIENT INSTRUCTIONS
ZEPBOUND (ZEHP-bownd) (tirzepatide) injection, for subcutaneous use   What is the most important information I should know about ZEPBOUND? ZEPBOUND may cause serious side effects, including:   Possible thyroid tumors, including cancer. Tell your healthcare provider if you get a lump or swelling in your neck, hoarseness, trouble swallowing, or shortness of breath. These may be symptoms of thyroid cancer. In studies with rats, ZEPBOUND and medicines that work like ZEPBOUND caused thyroid tumors, including thyroid cancer. It is not known if ZEPBOUND will cause thyroid tumors, or a type of thyroid cancer called medullary thyroid carcinoma (MTC) in people.   Do not use ZEPBOUND if you or any of your family have ever had a type of thyroid cancer called MTC, or if you have an endocrine system condition called Multiple Endocrine Neoplasia syndrome type 2 (MEN 2).   What is ZEPBOUND?   ZEPBOUND is an injectable prescription medicine that may help adults with obesity, or with excess weight (overweight) who also have weight-related medical problems, lose weight and keep it off.   ZEPBOUND should be used with a reduced-calorie diet and increased physical activity.   ZEPBOUND contains tirzepatide and should not be used with other tirzepatide-containing products or any GLP-1 receptor agonist medicines.   It is not known if ZEPBOUND is safe and effective when taken with other prescription, over-the-counter, or herbal weight loss products.   It is not known if ZEPBOUND can be used in people who have had pancreatitis.   It is not known if ZEPBOUND is safe and effective for use in children under 18 years of age.   Do not use ZEPBOUND if:   you or any of your family have ever had a type of thyroid cancer called MTC or if you have an endocrine system condition called MEN 2.   you have had a serious allergic reaction to tirzepatide or any of the ingredients in ZEPBOUND. See the end of this Medication Guide for a complete list of  ingredients in ZEPBOUND.   Before using ZEPBOUND, tell your healthcare provider about all of your medical conditions, including if you:   have or have had problems with your pancreas or kidneys.   have severe problems with your stomach, such as slowed emptying of your stomach (gastroparesis) or problems with digesting food.   have a history of diabetic retinopathy.   are pregnant or plan to become pregnant. ZEPBOUND may harm your unborn baby. Tell your healthcare provider if you become pregnant while using ZEPBOUND.   Pregnancy Exposure Registry: There will be a pregnancy exposure registry for women who have taken ZEPBOUND during pregnancy. The purpose of this registry is to collect information about the health of you and your baby. Talk to your healthcare provider about how you can take part in this registry or you may contact [employer] at 8-214-DspauIo ([phone]).   Birth control pills by mouth may not work as well while using ZEPBOUND. If you take birth control pills by mouth, your healthcare provider may recommend another type of birth control for 4 weeks after you start ZEPBOUND and for 4 weeks after each increase in your dose of ZEPBOUND. Talk to your healthcare provider about birth control methods that may be right for you while using ZEPBOUND.   are breastfeeding or plan to breastfeed. It is not known if ZEPBOUND passes into your breast milk. Talk to your healthcare provider about the best way to feed your baby while using ZEPBOUND.   Tell your healthcare provider about all the medicines you take, including prescription and over-the-counter medicines, vitamins, and herbal supplements. ZEPBOUND may affect the way some medicines work, and some medicines may affect the way ZEPBOUND works.   Before using ZEPBOUND, tell your healthcare provider if you are taking medicines to treat diabetes including insulin or sulfonylureas which could increase your risk of low blood sugar. Talk to your  healthcare provider about low blood sugar levels and how to manage them.   Know the medicines you take. Keep a list of them to show your healthcare provider and pharmacist when you get a new medicine.   How should I use ZEPBOUND?   Read the Instructions for Use that comes with ZEPBOUND.   Use ZEPBOUND exactly as your healthcare provider tells you to. A healthcare provider should show you how to prepare to inject your dose of ZEPBOUND before injecting the first time.   ZEPBOUND is injected under the skin (subcutaneously) of your stomach (abdomen), thigh, or upper arm.   Use ZEPBOUND 1 time each week, at any time of the day.   You may change the day of the week you use ZEPBOUND as long as the time between the 2 doses is at least 3 days (72 hours).   If you miss a dose of ZEPBOUND, take the missed dose as soon as possible within 4 days (96 hours) after the missed dose. If more than 4 days have passed, skip the missed dose and take your next dose on the regularly scheduled day. Do not take 2 doses of ZEPBOUND within 3 days (72 hours) of each other.   ZEPBOUND may be taken with or without food.   Change (rotate) your injection site with each weekly injection. You may use the same area of your body but be sure to choose a different injection site in that area. Do not use the same site for each injection.   In case of overdose, get medical help or contact a [facility] expert right away at 1-339.891.9065. Advice is also available online at poisonhelp.org.   What are the possible side effects of ZEPBOUND?   ZEPBOUND may cause serious side effects, including:   See What is the most important information I should know about ZEPBOUND?   severe stomach problems. Stomach problems, sometimes severe, have been reported in people who use ZEPBOUND. Tell your healthcare provider if you have stomach problems that are severe or will not go away.   kidney problems (kidney failure). Diarrhea, nausea, and vomiting may cause a loss of  fluids (dehydration) which may cause kidney problems. It is important for you to drink fluids to help reduce your chance of dehydration.   gallbladder problems. Gallbladder problems have happened in some people who use ZEPBOUND. Tell your healthcare provider right away if you get symptoms of gallbladder problems which may include:   pain in your upper stomach (abdomen)   yellowing of skin or eyes (jaundice)   fever   bay-colored stools   inflammation of your pancreas (pancreatitis). Stop using ZEPBOUND and call your healthcare provider right away if you have severe pain in your stomach area (abdomen) that will not go away, with or without vomiting. You may feel the pain from your abdomen to your back.   serious allergic reactions. Stop using ZEPBOUND and get medical help right away if you have any symptoms of a serious allergic reaction including:   swelling of your face, lips, tongue or throat   fainting or feeling dizzy   problems breathing or swallowing   very rapid heartbeat   severe rash or itching   low blood sugar (hypoglycemia). Your risk for getting low blood sugar may be higher if you use ZEPBOUND with medicines that can cause low blood sugar, such as a sulfonylurea or insulin. Signs and symptoms of low blood sugar may include:   dizziness or light-headedness   blurred vision   anxiety, irritability, or mood changes   sweating   slurred speech   hunger   confusion or drowsiness   shakiness   weakness   headache   fast heartbeat   feeling jittery   changes in vision in patients with type 2 diabetes. Tell your healthcare provider if you have changes in vision during treatment with ZEPBOUND.   depression or thoughts of suicide. You should pay attention to any changes in your mood, behaviors, feelings, or thoughts. Call your healthcare provider right away if you have any changes to your mental health that are new, worse, or worry you.   The most common side effects of ZEPBOUND include:   nausea   stomach  (abdominal) pain   allergic reactions   diarrhea   indigestion   belching   vomiting   injection site reactions   hair loss   constipation   feeling tired   heartburn   Talk to your healthcare provider if you have any side effect that bothers you or that does not go away. These are not all the possible side effects of ZEPBOUND. Call your healthcare provider for medical advice about side effects. You may report side effects to FDA at 6-973-VPA-7529.   How should I store ZEPBOUND?   Store ZEPBOUND in the refrigerator between 36°F to 46°F (2°C to 8°C). Store ZEPBOUND in the original carton until use to protect it from light.   If needed, each single-dose pen can be stored at room temperature up to 86°F (30°C) for up to 21 days. If ZEPBOUND is stored at room temperature, it should not be returned to the refrigerator.   Discard if not used within 21 days after removing from the refrigerator.   Do not freeze ZEPBOUND. Do not use ZEPBOUND if frozen.   Keep ZEPBOUND and all medicines out of the reach of children.   General information about the safe and effective use of ZEPBOUND.   Medicines are sometimes prescribed for purposes other than those listed in a Medication Guide. Do not use ZEPBOUND for a condition for which it was not prescribed. Do not give ZEPBOUND to other people, even if they have the same condition you have. It may harm them. You can ask your pharmacist or healthcare provider for information about ZEPBOUND that is written for health professionals.   What are the ingredients in ZEPBOUND?   Active ingredient: tirzepatide   Inactive ingredients: sodium chloride, sodium phosphate dibasic heptahydrate, and water for injection. Hydrochloric acid solution and/or sodium hydroxide solution may have been added to adjust the pH.   For more information, go to [email or web address] or call 1-978.234.5861.

## 2024-07-10 NOTE — PROGRESS NOTES
Patient ID: Sameera Olivarez is a 54 y.o. White female    Subjective  Chief Complaint: patient presents for medical weight loss management.    Contraindications to GLP-1 receptor agonist therapy:   Denies personal or family history of MTC, personal history of MEN2, history of allergic reaction while taking a GLP-1 receptor agonist, and history of pancreatitis while taking a GLP-1 receptor agonist     Co-morbidities: none    History of weight loss therapy:  Pt reports being on Semaglutide for 6 months in the past and is currently on compounded Tirzepatide. Pt reports no N/V.    Weight loss history:    Current weight:    6/3/2024   Recent Readings    Weight (lbs) 160.5 lb    BMI 28.43 BMI          Objective  Lab Results   Component Value Date     06/06/2024     05/03/2023     10/12/2022     Lab Results   Component Value Date    K 4.7 06/06/2024    K 2.9 (L) 05/03/2023    K 3.7 10/12/2022     Lab Results   Component Value Date     06/06/2024     05/03/2023     10/12/2022     Lab Results   Component Value Date    CO2 24 06/06/2024    CO2 24 05/03/2023    CO2 26 10/12/2022     Lab Results   Component Value Date    BUN 14 06/06/2024    BUN 11 05/03/2023    BUN 14 10/12/2022     Lab Results   Component Value Date    GLU 98 06/06/2024    GLU 91 05/03/2023    GLU 77 10/12/2022     Lab Results   Component Value Date    CALCIUM 9.9 06/06/2024    CALCIUM 9.5 05/03/2023    CALCIUM 10.2 10/12/2022     Lab Results   Component Value Date    PROT 7.4 05/03/2023    PROT 8.1 10/12/2022    PROT 8.2 02/09/2022     Lab Results   Component Value Date    ALBUMIN 3.9 05/03/2023    ALBUMIN 4.3 10/12/2022    ALBUMIN 4.3 02/09/2022     Lab Results   Component Value Date    BILITOT 0.2 05/03/2023    BILITOT 0.4 10/12/2022    BILITOT 0.7 02/09/2022     Lab Results   Component Value Date    AST 18 05/03/2023    AST 20 10/12/2022    AST 16 02/09/2022     Lab Results   Component Value Date    ALT 16 05/03/2023     ALT 21 10/12/2022    ALT 16 02/09/2022     Lab Results   Component Value Date    ANIONGAP 8 06/06/2024    ANIONGAP 7 (L) 05/03/2023    ANIONGAP 10 10/12/2022     Lab Results   Component Value Date    CREATININE 0.8 06/06/2024    CREATININE 0.8 05/03/2023    CREATININE 0.9 10/12/2022     Lab Results   Component Value Date    EGFRNORACEVR >60 06/06/2024    EGFRNORACEVR >60.0 05/03/2023    EGFRNORACEVR >60.0 10/12/2022         Assessment/Plan  -Continuation of GLP-1 RA therapy  -Continue Zepbound 5 mg once weekly  -RTC in 3 months      Patient consented to pharmacist management via collaborative practice.

## 2024-07-11 ENCOUNTER — OFFICE VISIT (OUTPATIENT)
Dept: OBSTETRICS AND GYNECOLOGY | Facility: CLINIC | Age: 54
End: 2024-07-11
Payer: COMMERCIAL

## 2024-07-11 ENCOUNTER — PATIENT MESSAGE (OUTPATIENT)
Dept: OBSTETRICS AND GYNECOLOGY | Facility: CLINIC | Age: 54
End: 2024-07-11
Payer: COMMERCIAL

## 2024-07-11 VITALS — BODY MASS INDEX: 27.84 KG/M2 | DIASTOLIC BLOOD PRESSURE: 69 MMHG | SYSTOLIC BLOOD PRESSURE: 95 MMHG | WEIGHT: 157.19 LBS

## 2024-07-11 DIAGNOSIS — Z09 SURGERY FOLLOW-UP EXAMINATION: Primary | ICD-10-CM

## 2024-07-11 DIAGNOSIS — N95.1 MENOPAUSAL SYMPTOMS: ICD-10-CM

## 2024-07-11 DIAGNOSIS — Z90.710 STATUS POST HYSTERECTOMY: ICD-10-CM

## 2024-07-11 PROBLEM — E66.9 OBESITY, UNSPECIFIED: Status: ACTIVE | Noted: 2024-07-11

## 2024-07-11 PROBLEM — N93.9 ABNORMAL UTERINE BLEEDING: Status: RESOLVED | Noted: 2021-02-09 | Resolved: 2024-07-11

## 2024-07-11 PROCEDURE — 99999 PR PBB SHADOW E&M-EST. PATIENT-LVL III: CPT | Mod: PBBFAC,,, | Performed by: OBSTETRICS & GYNECOLOGY

## 2024-07-11 PROCEDURE — 99024 POSTOP FOLLOW-UP VISIT: CPT | Mod: S$GLB,,, | Performed by: OBSTETRICS & GYNECOLOGY

## 2024-07-11 PROCEDURE — 3074F SYST BP LT 130 MM HG: CPT | Mod: CPTII,S$GLB,, | Performed by: OBSTETRICS & GYNECOLOGY

## 2024-07-11 PROCEDURE — 3078F DIAST BP <80 MM HG: CPT | Mod: CPTII,S$GLB,, | Performed by: OBSTETRICS & GYNECOLOGY

## 2024-07-11 PROCEDURE — 1159F MED LIST DOCD IN RCRD: CPT | Mod: CPTII,S$GLB,, | Performed by: OBSTETRICS & GYNECOLOGY

## 2024-07-11 RX ORDER — ESTRADIOL 0.1 MG/D
1 FILM, EXTENDED RELEASE TRANSDERMAL
Qty: 8 PATCH | Refills: 11 | Status: SHIPPED | OUTPATIENT
Start: 2024-07-11 | End: 2025-07-11

## 2024-07-11 NOTE — PROGRESS NOTES
Postop visit    Sameera Olivarez is a 54 y.o.   patient who is status post robotic assisted hysterectomy with bilateral salpingo-oophorectomy on 06/10/2024.  Patient is doing well postoperatively.  No pain.  No bowel or bladder complaints.  No fever.      Patient reports that menopausal symptoms have improved on Vivelle patch (0.075 mg).  Patient reports that she is still wakes up twice at night with vasomotor symptoms.    The pathology report reviewed with patient.    Past Medical History:   Diagnosis Date    Anemia     borderline    Encounter for blood transfusion     GERD (gastroesophageal reflux disease)     Sjogren's syndrome      Past Surgical History:   Procedure Laterality Date    BELT ABDOMINOPLASTY       SECTION      COLONOSCOPY N/A 2019    Procedure: COLONOSCOPY;  Surgeon: Pete Soler MD;  Location: [facility] ([address] [place] [address]);  Service: Endoscopy;  Laterality: N/A;    ECTOPIC PREGNANCY SURGERY      right unsure if tube removed    HYSTERECTOMY      REPAIR OF COLLATERAL LIGAMENT OF THUMB Right 2022    Procedure: REPAIR, LIGAMENT, COLLATERAL, THUMB - right thumb ucl need arthrex;  Surgeon: Manuel Baltazar MD;  Location: [facility] OR;  Service: Orthopedics;  Laterality: Right;    TOTAL REDUCTION MAMMOPLASTY  1987    TUBAL LIGATION      XI ROBOTIC HYSTERECTOMY, WITH SALPINGO-OOPHORECTOMY N/A 06/10/2024    Procedure: XI ROBOTIC HYSTERECTOMY,WITH SALPINGO-OOPHORECTOMY;  Surgeon: Baltazar Stack IV, MD;  Location: [facility] [place] OR;  Service: OB/GYN;  Laterality: N/A;     Family History   Problem Relation Name Age of Onset    Sjogren's syndrome Paternal Grandfather      Achalasia Paternal Grandmother      Cerebral aneurysm Maternal Grandmother      Heart disease Maternal Grandfather  52    Heart disease Father  60    Breast cancer Mother  72    Breast cancer Maternal Aunt  65    Ovarian cancer Maternal Aunt  60    Heart disease Maternal Uncle      Lung cancer Maternal Uncle           smoker    Prostate cancer Maternal Uncle      Heart disease Paternal Aunt      Melanoma Neg Hx      Diabetes Neg Hx      Hypertension Neg Hx      Colon cancer Neg Hx      Cancer Neg Hx       Social History     Tobacco Use    Smoking status: Former     Current packs/day: 0.00     Types: Cigarettes     Quit date:      Years since quittin.5    Smokeless tobacco: Never    Tobacco comments:     quit 20 years ago   Substance Use Topics    Alcohol use: Yes     Alcohol/week: 2.0 standard drinks of alcohol     Types: 2 Glasses of wine per week     Comment: weekends    Drug use: No     OB History    Para Term  AB Living   3 2 2   1 2   SAB IAB Ectopic Multiple Live Births       1   2      # Outcome Date GA Lbr Damian/2nd Weight Sex Type Anes PTL Lv   3 Term      CS-LTranv   LETY   2 Term      Vag-Spont   LETY   1 Ectopic                BP 95/69 (BP Location: Left arm, Patient Position: Sitting)   Wt 71.3 kg (157 lb 3 oz)   LMP 2022   BMI 27.84 kg/m²     ROS:  GENERAL: No fever, chills, fatigability or weight loss.  VULVAR: No pain, no lesions and no itching.  VAGINAL: No relaxation, no itching, no discharge, no abnormal bleeding and no lesions.  ABDOMEN: No abdominal pain. Denies nausea. Denies vomiting. No diarrhea. No constipation  BREAST: Denies pain. No lumps. No discharge.  URINARY: No incontinence, no nocturia, no frequency and no dysuria.  CARDIOVASCULAR: No chest pain. No shortness of breath. No leg cramps.  NEUROLOGICAL: No headaches. No vision changes.    PE:   APPEARANCE: Well nourished, well developed, in no acute distress.  GENITOURINARY:  Vulva: No lesions. No erythema nor excoriations noted,Normal female genital architecture.  Urethral Meatus: Normal size and location, no lesions, no prolapse.  Urethra: No masses, tenderness, prolapse or scarring.  Vagina: Moist and with rugae, no discharge, no significant cystocele or rectocele.  Vaginal cuff healing well.  Cervix:  Absent  Uterus: Absent  Adnexa: No masses, tenderness or CDS nodularity.  Anus Perineum: No lesions, no relaxation, no external hemorrhoids.  Abdomen: No masses, tenderness, hernia or ascites, no hepatosplenomegaly.  Davinci port sites healed.   Skin: No rashes, lesions, ulcers, acne, hirsutism.  Peripheral/lower extremities: No edema, erythema or tenderness.  Lymphatic: No groin nodes palp.  Mental Status: Alert, oriented x 3, normal affect and mood        ICD-10-CM ICD-9-CM    1. Surgery follow-up examination  Z09 V67.00       2. Menopausal symptoms  N95.1 627.2 estradioL (VIVELLE-DOT) 0.1 mg/24 hr PTSW      3. S/p RA-TLH/BSO  Z90.710 V88.01         Plan:  Patient cleared for routine activity/exercise.    Continue pelvic rest x 10 weeks.  Patient verbalized understanding.    Annual exam in one year - Dr. Radha Patterson    Will increase ERT to 0.1 mg patch.  Patient instructed to monitor symptoms on new dosage.  Patient verbalized understanding of this discussion.  Risks and benefits reviewed.  Patient to continue vaginal estrogen cream.    Keep regular PCP follow-up     Baltazar Stack IV, MD

## 2024-07-22 ENCOUNTER — PATIENT MESSAGE (OUTPATIENT)
Dept: OBSTETRICS AND GYNECOLOGY | Facility: CLINIC | Age: 54
End: 2024-07-22
Payer: COMMERCIAL

## 2024-08-05 ENCOUNTER — PATIENT MESSAGE (OUTPATIENT)
Dept: ADMINISTRATIVE | Facility: OTHER | Age: 54
End: 2024-08-05
Payer: COMMERCIAL

## 2024-08-12 ENCOUNTER — OFFICE VISIT (OUTPATIENT)
Dept: DERMATOLOGY | Facility: CLINIC | Age: 54
End: 2024-08-12
Payer: COMMERCIAL

## 2024-08-12 DIAGNOSIS — L71.9 ROSACEA: Primary | ICD-10-CM

## 2024-08-12 PROCEDURE — 1160F RVW MEDS BY RX/DR IN RCRD: CPT | Mod: CPTII,S$GLB,, | Performed by: STUDENT IN AN ORGANIZED HEALTH CARE EDUCATION/TRAINING PROGRAM

## 2024-08-12 PROCEDURE — 99213 OFFICE O/P EST LOW 20 MIN: CPT | Mod: S$GLB,,, | Performed by: STUDENT IN AN ORGANIZED HEALTH CARE EDUCATION/TRAINING PROGRAM

## 2024-08-12 PROCEDURE — 99999 PR PBB SHADOW E&M-EST. PATIENT-LVL III: CPT | Mod: PBBFAC,,, | Performed by: STUDENT IN AN ORGANIZED HEALTH CARE EDUCATION/TRAINING PROGRAM

## 2024-08-12 PROCEDURE — 1159F MED LIST DOCD IN RCRD: CPT | Mod: CPTII,S$GLB,, | Performed by: STUDENT IN AN ORGANIZED HEALTH CARE EDUCATION/TRAINING PROGRAM

## 2024-08-12 RX ORDER — OXYMETAZOLINE HYDROCHLORIDE 1 G/100G
CREAM TOPICAL
Qty: 30 G | Refills: 6 | Status: SHIPPED | OUTPATIENT
Start: 2024-08-12

## 2024-08-12 NOTE — PROGRESS NOTES
Subjective:      Patient ID:  Sameera Olivarez is a 54 y.o. female who presents for   Chief Complaint   Patient presents with    Rosacea     Pt here for rosacea f/u     Pt last seen on 5/23/2024 for rosacea    Pt has been using metronidazole 1% cream up to 2 times daily.     Rosacea      For rosacea, using compounded azelaic acid 15% + ivermectin 1% + metronidazole 1% bid  Also using rhofade    Feels it is helping    Review of Systems   HENT:  Negative for headaches.    Eyes:  Negative for itching, eye watering, eye irritation and eyelid inflammation.   Gastrointestinal:  Negative for nausea, vomiting, diarrhea and Sensitivity to oral antibiotics.        Vascular instability syndrome: rosacea associated with GI sx and HA's   Skin:  Positive for wears hat. Negative for daily sunscreen use, activity-related sunscreen use and recent sunburn.   Neurological:  Negative for headaches.   Hematologic/Lymphatic: Does not bruise/bleed easily.       Objective:   Physical Exam   Constitutional: She appears well-developed and well-nourished. No distress.   Neurological: She is alert and oriented to person, place, and time. She is not disoriented.   Psychiatric: She has a normal mood and affect.   Skin:   Areas Examined (abnormalities noted in diagram):   Head / Face Inspection Performed            Diagram Legend     Erythematous scaling macule/papule c/w actinic keratosis       Vascular papule c/w angioma      Pigmented verrucoid papule/plaque c/w seborrheic keratosis      Yellow umbilicated papule c/w sebaceous hyperplasia      Irregularly shaped tan macule c/w lentigo     1-2 mm smooth white papules consistent with Milia      Movable subcutaneous cyst with punctum c/w epidermal inclusion cyst      Subcutaneous movable cyst c/w pilar cyst      Firm pink to brown papule c/w dermatofibroma      Pedunculated fleshy papule(s) c/w skin tag(s)      Evenly pigmented macule c/w junctional nevus     Mildly variegated pigmented,  slightly irregular-bordered macule c/w mildly atypical nevus      Flesh colored to evenly pigmented papule c/w intradermal nevus       Pink pearly papule/plaque c/w basal cell carcinoma      Erythematous hyperkeratotic cursted plaque c/w SCC      Surgical scar with no sign of skin cancer recurrence      Open and closed comedones      Inflammatory papules and pustules      Verrucoid papule consistent consistent with wart     Erythematous eczematous patches and plaques     Dystrophic onycholytic nail with subungual debris c/w onychomycosis     Umbilicated papule    Erythematous-base heme-crusted tan verrucoid plaque consistent with inflamed seborrheic keratosis     Erythematous Silvery Scaling Plaque c/w Psoriasis     See annotation      Assessment / Plan:        Rosacea  -     RHOFADE 1 % Crea; AAA face qday for redness  Dispense: 30 g; Refill: 6    - C/w compounded azelaic acid 15% + ivermectin 1% + metronidazole 1% cream. Apply to face once or twice daily     Relatively well controlled--continue         Follow up if symptoms worsen or fail to improve.

## 2024-08-17 DIAGNOSIS — L71.9 ROSACEA: ICD-10-CM

## 2024-09-03 ENCOUNTER — PATIENT MESSAGE (OUTPATIENT)
Dept: ADMINISTRATIVE | Facility: OTHER | Age: 54
End: 2024-09-03
Payer: COMMERCIAL

## 2024-09-05 ENCOUNTER — OFFICE VISIT (OUTPATIENT)
Dept: URGENT CARE | Facility: CLINIC | Age: 54
End: 2024-09-05
Payer: COMMERCIAL

## 2024-09-05 VITALS
TEMPERATURE: 99 F | BODY MASS INDEX: 27.82 KG/M2 | WEIGHT: 157 LBS | HEART RATE: 88 BPM | RESPIRATION RATE: 18 BRPM | HEIGHT: 63 IN | DIASTOLIC BLOOD PRESSURE: 81 MMHG | SYSTOLIC BLOOD PRESSURE: 124 MMHG | OXYGEN SATURATION: 97 %

## 2024-09-05 DIAGNOSIS — J06.9 UPPER RESPIRATORY TRACT INFECTION, UNSPECIFIED TYPE: Primary | ICD-10-CM

## 2024-09-05 DIAGNOSIS — J10.1 INFLUENZA A: ICD-10-CM

## 2024-09-05 DIAGNOSIS — R05.9 COUGH, UNSPECIFIED TYPE: ICD-10-CM

## 2024-09-05 DIAGNOSIS — J02.9 SORE THROAT: ICD-10-CM

## 2024-09-05 DIAGNOSIS — R06.00 DYSPNEA, UNSPECIFIED TYPE: ICD-10-CM

## 2024-09-05 LAB
CTP QC/QA: YES
CTP QC/QA: YES
POC MOLECULAR INFLUENZA A AGN: POSITIVE
POC MOLECULAR INFLUENZA B AGN: NEGATIVE
SARS-COV-2 AG RESP QL IA.RAPID: NEGATIVE

## 2024-09-05 RX ORDER — OSELTAMIVIR PHOSPHATE 75 MG/1
75 CAPSULE ORAL 2 TIMES DAILY
Qty: 10 CAPSULE | Refills: 0 | Status: SHIPPED | OUTPATIENT
Start: 2024-09-05 | End: 2024-09-05

## 2024-09-05 RX ORDER — PROMETHAZINE HYDROCHLORIDE AND DEXTROMETHORPHAN HYDROBROMIDE 6.25; 15 MG/5ML; MG/5ML
5 SYRUP ORAL EVERY 4 HOURS PRN
Qty: 240 ML | Refills: 0 | Status: SHIPPED | OUTPATIENT
Start: 2024-09-05 | End: 2024-09-15

## 2024-09-05 RX ORDER — LEVALBUTEROL TARTRATE 45 UG/1
1-2 AEROSOL, METERED ORAL EVERY 4 HOURS PRN
Qty: 15 G | Refills: 0 | Status: SHIPPED | OUTPATIENT
Start: 2024-09-05 | End: 2024-09-05

## 2024-09-05 RX ORDER — LEVALBUTEROL TARTRATE 45 UG/1
1-2 AEROSOL, METERED ORAL EVERY 4 HOURS PRN
Qty: 15 G | Refills: 0 | Status: SHIPPED | OUTPATIENT
Start: 2024-09-05 | End: 2025-09-05

## 2024-09-05 RX ORDER — PROMETHAZINE HYDROCHLORIDE AND DEXTROMETHORPHAN HYDROBROMIDE 6.25; 15 MG/5ML; MG/5ML
5 SYRUP ORAL EVERY 4 HOURS PRN
Qty: 240 ML | Refills: 0 | Status: SHIPPED | OUTPATIENT
Start: 2024-09-05 | End: 2024-09-05

## 2024-09-05 RX ORDER — OSELTAMIVIR PHOSPHATE 75 MG/1
75 CAPSULE ORAL 2 TIMES DAILY
Qty: 10 CAPSULE | Refills: 0 | Status: SHIPPED | OUTPATIENT
Start: 2024-09-05 | End: 2024-09-10

## 2024-09-05 NOTE — PROGRESS NOTES
Subjective:      Patient ID: Sameera Olivarez is a 54 y.o. female.    Vitals:  vitals were not taken for this visit.     Chief Complaint: URI    This is a 54 y.o. female who presents today with a chief complaint of  URI.  Symptoms on Sunday. Patient has a hoarse voice, Trouble swallowing , a body aches and chest / nasal congestion.       URI   This is a new problem. The current episode started in the past 7 days. The problem has been gradually worsening. There has been no fever. The cough is Non-productive. Associated symptoms include congestion, ear pain and a sore throat.       HENT:  Positive for ear pain, congestion and sore throat.     Objective:     Physical Exam   Constitutional: She is oriented to person, place, and time. She appears ill. She appears distressed.   HENT:   Head: Normocephalic and atraumatic.   Ears:   Right Ear: Tympanic membrane, external ear and ear canal normal.   Left Ear: Tympanic membrane, external ear and ear canal normal.   Nose: Rhinorrhea present.   Mouth/Throat: Mucous membranes are moist. Oropharynx is clear.   Eyes: Conjunctivae are normal. Pupils are equal, round, and reactive to light. Extraocular movement intact   Neck: Neck supple.   Cardiovascular: Normal rate, regular rhythm, normal heart sounds and normal pulses.   No murmur heard.  Pulmonary/Chest: Effort normal. No respiratory distress. She has rhonchi.   Abdominal: Normal appearance and bowel sounds are normal. Soft. flat abdomen   Musculoskeletal: Normal range of motion.         General: Normal range of motion.   Neurological: no focal deficit. She is alert, oriented to person, place, and time and at baseline.   Skin: Skin is warm and dry. Capillary refill takes less than 2 seconds. jaundice  Psychiatric: Her behavior is normal. Mood, judgment and thought content normal.     Assessment:     Plan:   1. Upper respiratory tract infection, unspecified type  - SARS Coronavirus 2 Antigen, POCT Manual Read  - POCT Influenza  A/B MOLECULAR    2. Cough, unspecified type  - POCT Influenza A/B MOLECULAR  - promethazine-dextromethorphan (PROMETHAZINE-DM) 6.25-15 mg/5 mL Syrp; Take 5 mLs by mouth every 4 (four) hours as needed.  Dispense: 240 mL; Refill: 0    3. Dyspnea, unspecified type  - levalbuterol (XOPENEX HFA) 45 mcg/actuation inhaler; Inhale 1-2 puffs into the lungs every 4 (four) hours as needed for Wheezing. Rescue  Dispense: 15 g; Refill: 0    4. Sore throat  - POCT Strep A, Molecular    5. Influenza A  - oseltamivir (TAMIFLU) 75 MG capsule; Take 1 capsule (75 mg total) by mouth 2 (two) times daily. for 5 days  Dispense: 10 capsule; Refill: 0   All results discussed with pt prior to discharge from clinic

## 2024-09-26 ENCOUNTER — PATIENT MESSAGE (OUTPATIENT)
Dept: INTERNAL MEDICINE | Facility: CLINIC | Age: 54
End: 2024-09-26
Payer: COMMERCIAL

## 2024-09-26 DIAGNOSIS — E66.9 OBESITY, UNSPECIFIED CLASSIFICATION, UNSPECIFIED OBESITY TYPE, UNSPECIFIED WHETHER SERIOUS COMORBIDITY PRESENT: ICD-10-CM

## 2024-09-26 DIAGNOSIS — E66.9 OBESITY, UNSPECIFIED CLASSIFICATION, UNSPECIFIED OBESITY TYPE, UNSPECIFIED WHETHER SERIOUS COMORBIDITY PRESENT: Primary | ICD-10-CM

## 2024-09-26 RX ORDER — TIRZEPATIDE 5 MG/.5ML
5 INJECTION, SOLUTION SUBCUTANEOUS
Qty: 2 ML | Refills: 0 | Status: ACTIVE | OUTPATIENT
Start: 2024-09-26 | End: 2024-09-27 | Stop reason: DRUGHIGH

## 2024-09-27 RX ORDER — TIRZEPATIDE 7.5 MG/.5ML
7.5 INJECTION, SOLUTION SUBCUTANEOUS
Qty: 2 ML | Refills: 2 | Status: ACTIVE | OUTPATIENT
Start: 2024-09-27

## 2024-10-10 NOTE — PROGRESS NOTES
Patient ID: Sameera Olivarez is a 54 y.o. White female    Subjective  Chief Complaint: patient presents for medical weight loss management.    Co-morbidities: none    HPI: Patient continued [facility] with [facility] in July 2024 and is currently managed on Zepbound 7.5 mg. Pt just took first dose of this strength. Pt was on compounded semaglutide for 6 months when presenting to the [facility] and reported seeing little to no benefit with the medication.    Tolerance to current therapy:  Denies nausea, vomiting, diarrhea, constipation, abdominal pain    Weight loss history:  Starting weight: 163 lbs - pt reported  Current weight:    10/8/2024   Recent Readings    Weight (lbs) 145 lb    BMI 25.68 BMI    % weight loss since GLP-1 initiation: 11.0 %    Objective  Lab Results   Component Value Date     06/06/2024     05/03/2023     10/12/2022     Lab Results   Component Value Date    K 4.7 06/06/2024    K 2.9 (L) 05/03/2023    K 3.7 10/12/2022     Lab Results   Component Value Date     06/06/2024     05/03/2023     10/12/2022     Lab Results   Component Value Date    CO2 24 06/06/2024    CO2 24 05/03/2023    CO2 26 10/12/2022     Lab Results   Component Value Date    BUN 14 06/06/2024    BUN 11 05/03/2023    BUN 14 10/12/2022     Lab Results   Component Value Date    GLU 98 06/06/2024    GLU 91 05/03/2023    GLU 77 10/12/2022     Lab Results   Component Value Date    CALCIUM 9.9 06/06/2024    CALCIUM 9.5 05/03/2023    CALCIUM 10.2 10/12/2022     Lab Results   Component Value Date    PROT 7.4 05/03/2023    PROT 8.1 10/12/2022    PROT 8.2 02/09/2022     Lab Results   Component Value Date    ALBUMIN 3.9 05/03/2023    ALBUMIN 4.3 10/12/2022    ALBUMIN 4.3 02/09/2022     Lab Results   Component Value Date    BILITOT 0.2 05/03/2023    BILITOT 0.4 10/12/2022    BILITOT 0.7 02/09/2022     Lab Results   Component Value Date    AST 18 05/03/2023    AST 20 10/12/2022     AST 16 02/09/2022     Lab Results   Component Value Date    ALT 16 05/03/2023    ALT 21 10/12/2022    ALT 16 02/09/2022     Lab Results   Component Value Date    ANIONGAP 8 06/06/2024    ANIONGAP 7 (L) 05/03/2023    ANIONGAP 10 10/12/2022     Lab Results   Component Value Date    CREATININE 0.8 06/06/2024    CREATININE 0.8 05/03/2023    CREATININE 0.9 10/12/2022     Lab Results   Component Value Date    EGFRNORACEVR >60 06/06/2024    EGFRNORACEVR >60.0 05/03/2023    EGFRNORACEVR >60.0 10/12/2022       Assessment/Plan  - Continue Zepbound 7.5 mg SQ weekly  - RTC in 6 months for follow-up evaluation    Patient consented to pharmacist management via collaborative practice.

## 2024-10-11 ENCOUNTER — OFFICE VISIT (OUTPATIENT)
Dept: INTERNAL MEDICINE | Facility: CLINIC | Age: 54
End: 2024-10-11
Payer: COMMERCIAL

## 2024-10-11 ENCOUNTER — PATIENT MESSAGE (OUTPATIENT)
Dept: INTERNAL MEDICINE | Facility: CLINIC | Age: 54
End: 2024-10-11

## 2024-10-11 DIAGNOSIS — E66.9 OBESITY, UNSPECIFIED CLASS, UNSPECIFIED OBESITY TYPE, UNSPECIFIED WHETHER SERIOUS COMORBIDITY PRESENT: Primary | ICD-10-CM

## 2024-10-11 RX ORDER — TIRZEPATIDE 7.5 MG/.5ML
7.5 INJECTION, SOLUTION SUBCUTANEOUS
Qty: 2 ML | Refills: 4 | Status: ACTIVE | OUTPATIENT
Start: 2024-10-11

## 2024-10-14 ENCOUNTER — IMMUNIZATION (OUTPATIENT)
Dept: INTERNAL MEDICINE | Facility: CLINIC | Age: 54
End: 2024-10-14
Payer: COMMERCIAL

## 2024-10-14 DIAGNOSIS — Z23 NEED FOR VACCINATION: Primary | ICD-10-CM

## 2024-10-14 PROCEDURE — 91320 SARSCV2 VAC 30MCG TRS-SUC IM: CPT | Mod: S$GLB,,, | Performed by: INTERNAL MEDICINE

## 2024-10-14 PROCEDURE — 90480 ADMN SARSCOV2 VAC 1/ONLY CMP: CPT | Mod: S$GLB,,, | Performed by: INTERNAL MEDICINE

## 2024-10-28 ENCOUNTER — PATIENT MESSAGE (OUTPATIENT)
Dept: ADMINISTRATIVE | Facility: OTHER | Age: 54
End: 2024-10-28
Payer: COMMERCIAL

## 2024-10-31 DIAGNOSIS — N95.1 PERIMENOPAUSE: ICD-10-CM

## 2024-10-31 RX ORDER — TESTOSTERONE CYPIONATE 200 MG/ML
50 INJECTION, SOLUTION INTRAMUSCULAR
Qty: 1 ML | Refills: 0 | Status: SHIPPED | OUTPATIENT
Start: 2024-10-31

## 2024-10-31 RX ORDER — HYDROCHLOROTHIAZIDE 25 MG/1
25 TABLET ORAL DAILY
Qty: 90 TABLET | Refills: 3 | Status: SHIPPED | OUTPATIENT
Start: 2024-10-31 | End: 2025-10-31

## 2024-11-06 ENCOUNTER — TELEPHONE (OUTPATIENT)
Dept: OBSTETRICS AND GYNECOLOGY | Facility: CLINIC | Age: 54
End: 2024-11-06
Payer: COMMERCIAL

## 2024-11-06 ENCOUNTER — LAB VISIT (OUTPATIENT)
Dept: LAB | Facility: HOSPITAL | Age: 54
End: 2024-11-06
Attending: OBSTETRICS & GYNECOLOGY
Payer: COMMERCIAL

## 2024-11-06 ENCOUNTER — PATIENT MESSAGE (OUTPATIENT)
Dept: OBSTETRICS AND GYNECOLOGY | Facility: CLINIC | Age: 54
End: 2024-11-06
Payer: COMMERCIAL

## 2024-11-06 DIAGNOSIS — N95.1 PERIMENOPAUSE: ICD-10-CM

## 2024-11-06 DIAGNOSIS — N95.1 PERIMENOPAUSE: Primary | ICD-10-CM

## 2024-11-06 LAB
ESTRADIOL SERPL-MCNC: 76 PG/ML
TESTOST SERPL-MCNC: 31 NG/DL (ref 5–73)

## 2024-11-06 PROCEDURE — 82670 ASSAY OF TOTAL ESTRADIOL: CPT | Performed by: OBSTETRICS & GYNECOLOGY

## 2024-11-06 PROCEDURE — 84402 ASSAY OF FREE TESTOSTERONE: CPT | Performed by: OBSTETRICS & GYNECOLOGY

## 2024-11-06 PROCEDURE — 84403 ASSAY OF TOTAL TESTOSTERONE: CPT | Performed by: OBSTETRICS & GYNECOLOGY

## 2024-11-06 PROCEDURE — 36415 COLL VENOUS BLD VENIPUNCTURE: CPT | Performed by: OBSTETRICS & GYNECOLOGY

## 2024-11-11 ENCOUNTER — OFFICE VISIT (OUTPATIENT)
Dept: OBSTETRICS AND GYNECOLOGY | Facility: CLINIC | Age: 54
End: 2024-11-11
Payer: COMMERCIAL

## 2024-11-11 VITALS
BODY MASS INDEX: 25.78 KG/M2 | WEIGHT: 145.5 LBS | DIASTOLIC BLOOD PRESSURE: 85 MMHG | HEIGHT: 63 IN | SYSTOLIC BLOOD PRESSURE: 123 MMHG

## 2024-11-11 DIAGNOSIS — N95.1 MENOPAUSAL SYMPTOMS: ICD-10-CM

## 2024-11-11 DIAGNOSIS — N95.1 MENOPAUSAL SYNDROME: Primary | ICD-10-CM

## 2024-11-11 DIAGNOSIS — N95.1 PERIMENOPAUSE: ICD-10-CM

## 2024-11-11 LAB — TESTOST FREE SERPL-MCNC: 0.7 PG/ML

## 2024-11-11 PROCEDURE — 99214 OFFICE O/P EST MOD 30 MIN: CPT | Mod: S$GLB,,, | Performed by: OBSTETRICS & GYNECOLOGY

## 2024-11-11 PROCEDURE — 3008F BODY MASS INDEX DOCD: CPT | Mod: CPTII,S$GLB,, | Performed by: OBSTETRICS & GYNECOLOGY

## 2024-11-11 PROCEDURE — 99999 PR PBB SHADOW E&M-EST. PATIENT-LVL III: CPT | Mod: PBBFAC,,, | Performed by: OBSTETRICS & GYNECOLOGY

## 2024-11-11 PROCEDURE — 1160F RVW MEDS BY RX/DR IN RCRD: CPT | Mod: CPTII,S$GLB,, | Performed by: OBSTETRICS & GYNECOLOGY

## 2024-11-11 PROCEDURE — 3074F SYST BP LT 130 MM HG: CPT | Mod: CPTII,S$GLB,, | Performed by: OBSTETRICS & GYNECOLOGY

## 2024-11-11 PROCEDURE — 3079F DIAST BP 80-89 MM HG: CPT | Mod: CPTII,S$GLB,, | Performed by: OBSTETRICS & GYNECOLOGY

## 2024-11-11 PROCEDURE — 1159F MED LIST DOCD IN RCRD: CPT | Mod: CPTII,S$GLB,, | Performed by: OBSTETRICS & GYNECOLOGY

## 2024-11-11 RX ORDER — TESTOSTERONE CYPIONATE 200 MG/ML
50 INJECTION, SOLUTION INTRAMUSCULAR
Qty: 1 ML | Refills: 1 | Status: SHIPPED | OUTPATIENT
Start: 2024-11-11

## 2024-11-11 RX ORDER — ESTRADIOL 0.1 MG/D
1 FILM, EXTENDED RELEASE TRANSDERMAL
Qty: 12 PATCH | Refills: 6 | Status: SHIPPED | OUTPATIENT
Start: 2024-11-11

## 2024-11-11 RX ORDER — PROGESTERONE 100 MG/1
100 CAPSULE ORAL NIGHTLY
Qty: 30 CAPSULE | Refills: 12 | Status: SHIPPED | OUTPATIENT
Start: 2024-11-11 | End: 2025-11-11

## 2024-11-11 NOTE — PROGRESS NOTES
Sameera Olivarez is a 54 y.o. female who presents to discuss ongoing hormone replacement therapy.  Menarche occurred at age 9 and the patient went into menopause at 51 years of age, which was 3 years ago. Patient  was placed on HRT in the perimenopause due to severe vasomotor symptoms, but began to have bleeding again while on HRT. She underwent [facility] Assisted TLHBSO on Brenda 10,2024 and pathology was benign.   Current HRT Vivelle Dot 0.1mg and injections of Testosterone 50 mg IM monthly as given per her spouse who is also a physician.    Currently reports continued intermittent hot flashes 2-3 times /week, especially at night, but no longer has night sweats and symptoms are not severe. She does also report decreased libido, but has thinning head hair (and we discussed that this may worsen if testosterone level is increased)     She denies the following contraindications to HRT:  Vaginal bleeding, history of VTE/PE, thrombophilia,  breast cancer, or active liver disease.       PCP: Dr. Sameera Parker       Routine labs: February 2022  Pap smear: 3/5/2021  Mammogram: [id number] Category: Overall: 1 - Negative  DEXA: 3-: Normal bone mineral density   Colonoscopy: 3-: Normal    Lab Visit on 11/06/2024   Component Date Value Ref Range Status    Estradiol 11/06/2024 76  See Text pg/mL Final    Testosterone, Free 11/06/2024 0.7  pg/mL Final    Testosterone, Total 11/06/2024 31  5 - 73 ng/dL Final   Office Visit on 09/05/2024   Component Date Value Ref Range Status    SARS Coronavirus 2 Antigen 09/05/2024 Negative  Negative Final     Acceptable 09/05/2024 Yes   Final    POC Molecular Influenza A Ag 09/05/2024 Positive (A)  Negative Final    POC Molecular Influenza B Ag 09/05/2024 Negative  Negative Final     Acceptable 09/05/2024 Yes   Final       Past Medical History:   Diagnosis Date    Anemia     borderline    Encounter for blood transfusion 2001    GERD  (gastroesophageal reflux disease)     Sjogren's syndrome      Past Surgical History:   Procedure Laterality Date    BELT ABDOMINOPLASTY  2014     SECTION      COLONOSCOPY N/A 2019    Procedure: COLONOSCOPY;  Surgeon: Pete Soler MD;  Location: [facility] ENDO (4TH FLR);  Service: Endoscopy;  Laterality: N/A;    ECTOPIC PREGNANCY SURGERY      right unsure if tube removed    HYSTERECTOMY      REPAIR OF COLLATERAL LIGAMENT OF THUMB Right 2022    Procedure: REPAIR, LIGAMENT, COLLATERAL, THUMB - right thumb ucl need arthrex;  Surgeon: Manuel Baltazar MD;  Location: [facility];  Service: Orthopedics;  Laterality: Right;    TOTAL REDUCTION MAMMOPLASTY      TUBAL LIGATION      XI ROBOTIC HYSTERECTOMY, WITH SALPINGO-OOPHORECTOMY N/A 06/10/2024    Procedure: XI ROBOTIC HYSTERECTOMY,WITH SALPINGO-OOPHORECTOMY;  Surgeon: Baltazar Stack IV, MD;  [facility];  Service: OB/GYN;  Laterality: N/A;     Social History     Tobacco Use    Smoking status: Former     Current packs/day: 0.00     Types: Cigarettes     Quit date:      Years since quittin.8    Smokeless tobacco: Never    Tobacco comments:     quit 20 years ago   Substance Use Topics    Alcohol use: Yes     Alcohol/week: 2.0 standard drinks of alcohol     Types: 2 Glasses of wine per week     Comment: weekends    Drug use: No     Family History   Problem Relation Name Age of Onset    Sjogren's syndrome Paternal Grandfather      Achalasia Paternal Grandmother      Cerebral aneurysm Maternal Grandmother      Heart disease Maternal Grandfather  52    Heart disease Father  60    Breast cancer Mother  72    Breast cancer Maternal Aunt  65    Ovarian cancer Maternal Aunt  60    Heart disease Maternal Uncle      Lung cancer Maternal Uncle          smoker    Prostate cancer Maternal Uncle      Heart disease Paternal Aunt      Melanoma Neg Hx      Diabetes Neg Hx      Hypertension Neg Hx      Colon cancer Neg Hx      Cancer Neg Hx    "    OB History    Para Term  AB Living   3 2 2   1 2   SAB IAB Ectopic Multiple Live Births       1   2      # Outcome Date GA Lbr Damian/2nd Weight Sex Type Anes PTL Lv   3 Term      CS-LTranv   LETY   2 Term      Vag-Spont   LETY   1 Ectopic                Current Outpatient Medications:     estradioL (ESTRACE) 0.01 % (0.1 mg/gram) vaginal cream, Insert 0.5 grams with applicator or dime-sized amount with finger in vagina nightly for  2 weeks, then twice a week thereafter, Disp: 42.5 g, Rfl: 11    finasteride (PROPECIA) 1 mg tablet, Take 1 tablet (1 mg total) by mouth once daily., Disp: 90 tablet, Rfl: 2    hydroCHLOROthiazide (HYDRODIURIL) 25 MG tablet, Take 1 tablet (25 mg total) by mouth once daily., Disp: 90 tablet, Rfl: 3    metroNIDAZOLE (NORITATE) 1 % cream, Compound azelaic acid 15% + ivermectin 1% + metronidazole 1% cream. Apply to face once or twice daily., Disp: 30 g, Rfl: 6    RHOFADE 1 % Crea, AAA face qday for redness, Disp: 30 g, Rfl: 6    syringe with needle 3 mL 22 x 1 1/2" Syrg, Use 1 syringe every 30 days, Disp: 12 each, Rfl: 0    tirzepatide, weight loss, (ZEPBOUND) 7.5 mg/0.5 mL PnIj, Inject 7.5 mg into the skin every 7 days., Disp: 2 mL, Rfl: 4    estradioL (VIVELLE-DOT) 0.1 mg/24 hr PTSW, Place 1 patch onto the skin Every 3 (three) days., Disp: 12 patch, Rfl: 6    levalbuterol (XOPENEX HFA) 45 mcg/actuation inhaler, Inhale 1-2 puffs into the lungs every 4 (four) hours as needed for Wheezing. Rescue, Disp: 15 g, Rfl: 0    progesterone (PROMETRIUM) 100 MG capsule, Take 1 capsule (100 mg total) by mouth nightly., Disp: 30 capsule, Rfl: 12    testosterone cypionate (DEPOTESTOTERONE CYPIONATE) 200 mg/mL injection, Inject 0.25 mLs (50 mg total) into the muscle every 28 days., Disp: 1 mL, Rfl: 1    Review of Systems:  General: No fever, chills, or weight loss.  Chest: No chest pain, shortness of breath, or palpitations.  Breast: No pain, masses, or nipple discharge.  Vulva: No pain, " "lesions, or itching.  Vagina: No relaxation, itching, discharge, or lesions.  Abdomen: No pain, nausea, vomiting, diarrhea, or constipation.  Urinary: No incontinence, nocturia, frequency, or dysuria.  Extremities:  No leg cramps, edema, or calf pain.  Neurologic: No headaches, dizziness, or visual changes.    Vitals:    11/11/24 1409   BP: 123/85   Weight: 66 kg (145 lb 8.1 oz)   Height: 5' 3" (1.6 m)   PainSc: 0-No pain     Body mass index is 25.77 kg/m².    Assessment:    Menopausal syndrome  -     progesterone (PROMETRIUM) 100 MG capsule; Take 1 capsule (100 mg total) by mouth nightly.  Dispense: 30 capsule; Refill: 12    Menopausal symptoms  -     estradioL (VIVELLE-DOT) 0.1 mg/24 hr PTSW; Place 1 patch onto the skin Every 3 (three) days.  Dispense: 12 patch; Refill: 6    Perimenopause  -     testosterone cypionate (DEPOTESTOTERONE CYPIONATE) 200 mg/mL injection; Inject 0.25 mLs (50 mg total) into the muscle every 28 days.  Dispense: 1 mL; Refill: 1        Plan:   Risks and benefits of hormone replacement therapy were discussed.  Hormone replacement therapy options, including bioidentical versus non-bioidentical hormones, as well as alternatives discussed.  PLAN:  Continue Vivelle Dot 0.1  Continue Testosterone 50mg  Begin Prometrium 100mg    Follow up in 3 months  Will recheck labs once on typical optimal dose or if having side effects.  Instructed patient to call if she experiences any side effects or has any questions.  I spent 30 minutes with this patient today, >50% counseling.    "

## 2024-11-21 ENCOUNTER — PATIENT MESSAGE (OUTPATIENT)
Dept: ADMINISTRATIVE | Facility: OTHER | Age: 54
End: 2024-11-21
Payer: COMMERCIAL

## 2024-11-22 DIAGNOSIS — Z00.00 ANNUAL PHYSICAL EXAM: Primary | ICD-10-CM

## 2024-11-26 ENCOUNTER — PATIENT MESSAGE (OUTPATIENT)
Dept: ADMINISTRATIVE | Facility: HOSPITAL | Age: 54
End: 2024-11-26
Payer: COMMERCIAL

## 2024-12-03 ENCOUNTER — LAB VISIT (OUTPATIENT)
Dept: LAB | Facility: HOSPITAL | Age: 54
End: 2024-12-03
Attending: STUDENT IN AN ORGANIZED HEALTH CARE EDUCATION/TRAINING PROGRAM
Payer: COMMERCIAL

## 2024-12-03 DIAGNOSIS — Z00.00 ANNUAL PHYSICAL EXAM: ICD-10-CM

## 2024-12-03 LAB
ALBUMIN SERPL BCP-MCNC: 4.4 G/DL (ref 3.5–5.2)
ALP SERPL-CCNC: 161 U/L (ref 40–150)
ALT SERPL W/O P-5'-P-CCNC: 64 U/L (ref 10–44)
ANION GAP SERPL CALC-SCNC: 9 MMOL/L (ref 8–16)
AST SERPL-CCNC: 52 U/L (ref 10–40)
BASOPHILS # BLD AUTO: 0.04 K/UL (ref 0–0.2)
BASOPHILS NFR BLD: 0.6 % (ref 0–1.9)
BILIRUB SERPL-MCNC: 0.5 MG/DL (ref 0.1–1)
BUN SERPL-MCNC: 17 MG/DL (ref 6–20)
CALCIUM SERPL-MCNC: 10.5 MG/DL (ref 8.7–10.5)
CHLORIDE SERPL-SCNC: 101 MMOL/L (ref 95–110)
CHOLEST SERPL-MCNC: 252 MG/DL (ref 120–199)
CHOLEST/HDLC SERPL: 4.5 {RATIO} (ref 2–5)
CO2 SERPL-SCNC: 27 MMOL/L (ref 23–29)
CREAT SERPL-MCNC: 0.8 MG/DL (ref 0.5–1.4)
DIFFERENTIAL METHOD BLD: ABNORMAL
EOSINOPHIL # BLD AUTO: 0.1 K/UL (ref 0–0.5)
EOSINOPHIL NFR BLD: 1.5 % (ref 0–8)
ERYTHROCYTE [DISTWIDTH] IN BLOOD BY AUTOMATED COUNT: 13.4 % (ref 11.5–14.5)
EST. GFR  (NO RACE VARIABLE): >60 ML/MIN/1.73 M^2
ESTIMATED AVG GLUCOSE: 88 MG/DL (ref 68–131)
GLUCOSE SERPL-MCNC: 82 MG/DL (ref 70–110)
HBA1C MFR BLD: 4.7 % (ref 4–5.6)
HCT VFR BLD AUTO: 47 % (ref 37–48.5)
HDLC SERPL-MCNC: 56 MG/DL (ref 40–75)
HDLC SERPL: 22.2 % (ref 20–50)
HGB BLD-MCNC: 16.1 G/DL (ref 12–16)
IMM GRANULOCYTES # BLD AUTO: 0.02 K/UL (ref 0–0.04)
IMM GRANULOCYTES NFR BLD AUTO: 0.3 % (ref 0–0.5)
LDLC SERPL CALC-MCNC: 149.8 MG/DL (ref 63–159)
LYMPHOCYTES # BLD AUTO: 1.9 K/UL (ref 1–4.8)
LYMPHOCYTES NFR BLD: 27.7 % (ref 18–48)
MCH RBC QN AUTO: 33.1 PG (ref 27–31)
MCHC RBC AUTO-ENTMCNC: 34.3 G/DL (ref 32–36)
MCV RBC AUTO: 97 FL (ref 82–98)
MONOCYTES # BLD AUTO: 0.6 K/UL (ref 0.3–1)
MONOCYTES NFR BLD: 8.3 % (ref 4–15)
NEUTROPHILS # BLD AUTO: 4.2 K/UL (ref 1.8–7.7)
NEUTROPHILS NFR BLD: 61.6 % (ref 38–73)
NONHDLC SERPL-MCNC: 196 MG/DL
NRBC BLD-RTO: 0 /100 WBC
PLATELET # BLD AUTO: 323 K/UL (ref 150–450)
PMV BLD AUTO: 10.2 FL (ref 9.2–12.9)
POTASSIUM SERPL-SCNC: 4.3 MMOL/L (ref 3.5–5.1)
PROT SERPL-MCNC: 8.6 G/DL (ref 6–8.4)
RBC # BLD AUTO: 4.87 M/UL (ref 4–5.4)
SODIUM SERPL-SCNC: 137 MMOL/L (ref 136–145)
TRIGL SERPL-MCNC: 231 MG/DL (ref 30–150)
WBC # BLD AUTO: 6.75 K/UL (ref 3.9–12.7)

## 2024-12-03 PROCEDURE — 80053 COMPREHEN METABOLIC PANEL: CPT | Performed by: STUDENT IN AN ORGANIZED HEALTH CARE EDUCATION/TRAINING PROGRAM

## 2024-12-03 PROCEDURE — 83036 HEMOGLOBIN GLYCOSYLATED A1C: CPT | Performed by: STUDENT IN AN ORGANIZED HEALTH CARE EDUCATION/TRAINING PROGRAM

## 2024-12-03 PROCEDURE — 85025 COMPLETE CBC W/AUTO DIFF WBC: CPT | Performed by: STUDENT IN AN ORGANIZED HEALTH CARE EDUCATION/TRAINING PROGRAM

## 2024-12-03 PROCEDURE — 80061 LIPID PANEL: CPT | Performed by: STUDENT IN AN ORGANIZED HEALTH CARE EDUCATION/TRAINING PROGRAM

## 2024-12-06 ENCOUNTER — OFFICE VISIT (OUTPATIENT)
Dept: INTERNAL MEDICINE | Facility: CLINIC | Age: 54
End: 2024-12-06
Payer: COMMERCIAL

## 2024-12-06 VITALS
HEART RATE: 80 BPM | WEIGHT: 143.5 LBS | HEIGHT: 63 IN | BODY MASS INDEX: 25.43 KG/M2 | SYSTOLIC BLOOD PRESSURE: 116 MMHG | DIASTOLIC BLOOD PRESSURE: 66 MMHG

## 2024-12-06 DIAGNOSIS — M35.00 SJOGREN SYNDROME, UNSPECIFIED: ICD-10-CM

## 2024-12-06 DIAGNOSIS — L50.1 CHRONIC IDIOPATHIC URTICARIA: ICD-10-CM

## 2024-12-06 DIAGNOSIS — R74.8 ELEVATED LIVER ENZYMES: ICD-10-CM

## 2024-12-06 DIAGNOSIS — Z00.00 HEALTHCARE MAINTENANCE: ICD-10-CM

## 2024-12-06 DIAGNOSIS — Z00.00 ANNUAL PHYSICAL EXAM: Primary | ICD-10-CM

## 2024-12-06 PROBLEM — R06.00 DYSPNEA: Status: RESOLVED | Noted: 2024-09-05 | Resolved: 2024-12-06

## 2024-12-06 PROBLEM — E66.9 OBESITY, UNSPECIFIED: Status: RESOLVED | Noted: 2024-07-11 | Resolved: 2024-12-06

## 2024-12-06 PROBLEM — R29.898 WEAKNESS OF BOTH LOWER EXTREMITIES: Status: RESOLVED | Noted: 2017-11-21 | Resolved: 2024-12-06

## 2024-12-06 PROBLEM — Z12.4 CERVICAL CANCER SCREENING: Status: RESOLVED | Noted: 2021-02-09 | Resolved: 2024-12-06

## 2024-12-06 PROBLEM — J06.9 UPPER RESPIRATORY TRACT INFECTION: Status: RESOLVED | Noted: 2024-09-05 | Resolved: 2024-12-06

## 2024-12-06 PROBLEM — R05.9 COUGH: Status: RESOLVED | Noted: 2024-09-05 | Resolved: 2024-12-06

## 2024-12-06 PROBLEM — E66.3 OVERWEIGHT WITH BODY MASS INDEX (BMI) OF 27 TO 27.9 IN ADULT: Status: RESOLVED | Noted: 2022-09-30 | Resolved: 2024-12-06

## 2024-12-06 PROBLEM — J02.9 SORE THROAT: Status: RESOLVED | Noted: 2024-09-05 | Resolved: 2024-12-06

## 2024-12-06 PROCEDURE — 99999 PR PBB SHADOW E&M-EST. PATIENT-LVL IV: CPT | Mod: PBBFAC,,, | Performed by: STUDENT IN AN ORGANIZED HEALTH CARE EDUCATION/TRAINING PROGRAM

## 2024-12-06 RX ORDER — TRIAMCINOLONE ACETONIDE 1 MG/G
CREAM TOPICAL 2 TIMES DAILY PRN
Qty: 15 G | Refills: 3 | Status: SHIPPED | OUTPATIENT
Start: 2024-12-06 | End: 2025-12-06

## 2024-12-06 NOTE — ASSESSMENT & PLAN NOTE
Lab Results   Component Value Date    ALT 64 (H) 12/03/2024    AST 52 (H) 12/03/2024    ALKPHOS 161 (H) 12/03/2024    BILITOT 0.5 12/03/2024     We will repeat enzymes in 1 month.

## 2024-12-06 NOTE — ASSESSMENT & PLAN NOTE
Health care maintenance and core gaps reviewed and assessed with patient.  Vaccinations recommended:        Tetanus - 2015       Shingles - 2022       Influenza - 2022       Pneumonia - n/a  Colon cancer screening:   Colonoscopy: 2019  Lifestyle recommendations given.  Physical activity recommended.    Legend: Ordered (O), Declined (D), Current (C)

## 2024-12-06 NOTE — PATIENT INSTRUCTIONS
Try to avoid intake of carbohydrates and alcohol    We will repeat liver function tests in January

## 2024-12-06 NOTE — PROGRESS NOTES
Subjective:       Patient ID: Sameera Olivarez is a 54 y.o. female.    Chief Complaint: Annual Exam    HPI    Sameera Olivarez is a 54 y.o. female , English speaking, with a history of:  Sjogren's syndrome?  High risk of breast cancer  Menopause  GERD  Constipation         [Local Patient]  Originally from [facility]  Lives in: [facility] [address]       Patient comes to the clinic for a general physical exam (Annual Wellness Visit)    Overall patient states she has been doing well.    She joint to the [facility] is a weight loss program and she has been on is a bound for about 6 months.  She lost 18 lb.    She says her aches and pains have gone away with her weight loss.    She feels well.      Her only complaint continues to be occasional rash that comes and goes, it may come in different parts of the body and then it disappears on its own.    She continues to use topical moisturizers and hydrocortisone.    During this past year she had hysterectomy.      Gynecology is managing her menopausal symptoms with HR TS.    She is also on testosterone supplementation.        She has been experiencing insufficient asleep.  She wakes up at 2:00 a.m. in the morning and has difficulty going back to sleep.      No major changes in her diet or physical activity.    Patient denies constitutional or cardiovascular symptoms.    No other concerns at this time      Latest PCP visits:      05/02/2023 AW[facility]  9/30/22. Preoperatvie evaluation      Changes in health or medications: No    Specialists visits and recommendations:        H/o ER or Urgent care visits:   NO    H/o Hospitalizations:  NO    H/o falls: None     Life events / lifestyle:   Nothing new      Most recent / available laboratories reviewed with the patient:    Recent Labs   Lab 05/03/23  0722 06/06/24  1029 12/03/24  0750   WBC 7.25 7.08 6.75   Hemoglobin 12.5 13.9 16.1 H   Hematocrit 39.7 43.9 47.0   MCV 88 90 97   Platelets 378 435 323       Recent Labs   Lab 02/09/22  0731  10/12/22  0821 10/12/22  1325 05/03/23  0722 06/06/24  1029 12/03/24  0750   Glucose 104 91   < > 91 98 82   Sodium 137 136   < > 139 138 137   Potassium 3.5 2.9 L   < > 2.9 L 4.7 4.3   BUN 12 15   < > 11 14 17   Creatinine 1.0 0.9   < > 0.8 0.8 0.8   eGFR if non  >60.0  --   --   --   --   --    Total Bilirubin 0.7 0.4  --  0.2  --  0.5   AST 16 20  --  18  --  52 H   ALT 16 21  --  16  --  64 H    < > = values in this interval not displayed.       Recent Labs   Lab 02/09/22  0731 12/15/23  0732 12/03/24  0750   Hemoglobin A1C 5.1 5.4 4.7       Recent Labs   Lab 02/09/22  0731 12/15/23  0732 12/03/24  0750   Cholesterol 207 H 193 252 H   Triglycerides 78 91 231 H   HDL 49 31 L 56   LDL Cholesterol 142.4 143.8 149.8   Non-HDL Cholesterol 158 162 196       Recent Labs   Lab 02/09/22  0731 10/12/22  0821 05/03/23  0722   TSH 3.705 1.458 2.760               Current medications:    Current Outpatient Medications:     estradioL (ESTRACE) 0.01 % (0.1 mg/gram) vaginal cream, Insert 0.5 grams with applicator or dime-sized amount with finger in vagina nightly for  2 weeks, then twice a week thereafter, Disp: 42.5 g, Rfl: 11    estradioL (VIVELLE-DOT) 0.1 mg/24 hr PTSW, Place 1 patch onto the skin Every 3 (three) days., Disp: 12 patch, Rfl: 6    finasteride (PROPECIA) 1 mg tablet, Take 1 tablet (1 mg total) by mouth once daily., Disp: 90 tablet, Rfl: 2    hydroCHLOROthiazide (HYDRODIURIL) 25 MG tablet, Take 1 tablet (25 mg total) by mouth once daily., Disp: 90 tablet, Rfl: 3    metroNIDAZOLE (NORITATE) 1 % cream, Compound azelaic acid 15% + ivermectin 1% + metronidazole 1% cream. Apply to face once or twice daily., Disp: 30 g, Rfl: 6    progesterone (PROMETRIUM) 100 MG capsule, Take 1 capsule (100 mg total) by mouth nightly., Disp: 30 capsule, Rfl: 12    RHOFADE 1 % Crea, AAA face qday for redness, Disp: 30 g, Rfl: 6    testosterone cypionate (DEPOTESTOTERONE CYPIONATE) 200 mg/mL injection, Inject 0.25 mLs (50  "mg total) into the muscle every 28 days., Disp: 1 mL, Rfl: 1    tirzepatide, weight loss, (ZEPBOUND) 7.5 mg/0.5 mL PnIj, Inject 7.5 mg into the skin every 7 days., Disp: 2 mL, Rfl: 4    syringe with needle 3 mL 22 x 1 1/2" Syrg, Use 1 syringe every 30 days, Disp: 12 each, Rfl: 0    triamcinolone acetonide 0.1% (KENALOG) 0.1 % cream, Apply topically 2 (two) times daily as needed (rash)., Disp: 15 g, Rfl: 3      Healthcare Maintenance:  Colon cancer screening:   Last Colonoscopy completed on 3/23/2019     Vaccinations:        Tetanus: 2015       Pneumonia: no       Zoster: 2022       Influenza: 2023       COVID vaccination: completed x 6    Depression screening: PHQ2 score = 0    Annual Wellness visit approx. Month: DECEMBER ROS  11-point review of systems done. Negative except for detailed in the HPI.        Objective:      /66 (BP Location: Left arm, Patient Position: Sitting)   Pulse 80   Ht 5' 3" (1.6 m)   Wt 65.1 kg (143 lb 8.3 oz)   LMP 05/06/2022   BMI 25.42 kg/m²      Physical Exam   General appearance: Good general aspect, NAD, conversant   Eyes and HEENT: Normal sclerae, moist mucous membranes, no thyromegaly or lymphadenopathy  Respiratory: No work of breathing, clear to auscultation bilaterally. No rales, rhonchi, wheezing, or rubs.  Cardiovascular: PMI not displaced. RRR. Normal S1, S2. No murmurs, rubs or gallops.  Abdomen and : Soft, non-tender, nondistended, BS, no hepatosplenomegaly, no masses.  Extremities: no edemas, no extremity lymphadenopathy, no clubbing, no cyanosis.  Nervous System: Alert and oriented x 3, good concentration, no deficits.  Skin: Normal temperature, turgor and texture; no rash, ulcers or subcutaneous nodules  Psych: Appropriate affect, alert and oriented to person, place and time          Assessment:       1. Annual physical exam  Assessment & Plan:  Patient is in overall good general health.  Stable medical conditions listed on the PMH.  Labs reviewed and " patient notified.        2. Sjogren syndrome, unspecified  Assessment & Plan:  Asymptomatic.  Not on medication.  Observation for now.      3. Chronic idiopathic urticaria  Assessment & Plan:  Intermittent  Multifactorial.  I am prescribing topical steroid to be used as needed.    Orders:  -     triamcinolone acetonide 0.1% (KENALOG) 0.1 % cream; Apply topically 2 (two) times daily as needed (rash).  Dispense: 15 g; Refill: 3    4. Elevated liver enzymes  Assessment & Plan:  Lab Results   Component Value Date    ALT 64 (H) 12/03/2024    AST 52 (H) 12/03/2024    ALKPHOS 161 (H) 12/03/2024    BILITOT 0.5 12/03/2024     We will repeat enzymes in 1 month.    Orders:  -     COMPREHENSIVE METABOLIC PANEL; Future; Expected date: 12/06/2024    5. Healthcare maintenance  Assessment & Plan:  Health care maintenance and core gaps reviewed and assessed with patient.  Vaccinations recommended:        Tetanus - 2015       Shingles - 2022       Influenza - 2022       Pneumonia - n/a  Colon cancer screening:   Colonoscopy: 2019  Lifestyle recommendations given.  Physical activity recommended.    Legend: Ordered (O), Declined (D), Current (C)           Summary of orders / plan:         Repeat liver function tests.  Recommended to watch diet, avoid food rich in carbohydrates, fried food, alcohol.            Patient Instructions   Try to avoid intake of carbohydrates and alcohol    We will repeat liver function tests in January       Problem list updated  Medications reconciled  Education provided  Lifestyle recommendations given  AVS generated, explained, and sent to the patient.  RTC in : 1 year for annual wellness visit, labs not ordered yet              NOE KANG MD, MPH  Internal Medicine  [facility]  [facility]

## 2024-12-10 ENCOUNTER — PATIENT MESSAGE (OUTPATIENT)
Dept: INTERNAL MEDICINE | Facility: CLINIC | Age: 54
End: 2024-12-10
Payer: COMMERCIAL

## 2024-12-10 DIAGNOSIS — E66.9 OBESITY, UNSPECIFIED CLASS, UNSPECIFIED OBESITY TYPE, UNSPECIFIED WHETHER SERIOUS COMORBIDITY PRESENT: Primary | ICD-10-CM

## 2024-12-10 PROCEDURE — 99499 UNLISTED E&M SERVICE: CPT | Mod: S$GLB,,,

## 2024-12-10 RX ORDER — TIRZEPATIDE 10 MG/.5ML
10 INJECTION, SOLUTION SUBCUTANEOUS
Qty: 2 ML | Refills: 2 | Status: ACTIVE | OUTPATIENT
Start: 2024-12-10

## 2024-12-10 NOTE — TELEPHONE ENCOUNTER
Pt confirmed general tolerability with Zepbound 7.5 mg. To avoid unnecessary delays in titration, sent order to [facility] for Zepbound 10 mg. Will continue with scheduled follow-up.

## 2024-12-17 ENCOUNTER — PATIENT MESSAGE (OUTPATIENT)
Dept: ADMINISTRATIVE | Facility: OTHER | Age: 54
End: 2024-12-17
Payer: COMMERCIAL

## 2025-01-09 ENCOUNTER — PATIENT MESSAGE (OUTPATIENT)
Dept: ADMINISTRATIVE | Facility: OTHER | Age: 55
End: 2025-01-09
Payer: COMMERCIAL

## 2025-02-06 ENCOUNTER — PATIENT MESSAGE (OUTPATIENT)
Dept: ADMINISTRATIVE | Facility: OTHER | Age: 55
End: 2025-02-06
Payer: COMMERCIAL

## 2025-02-10 ENCOUNTER — HOSPITAL ENCOUNTER (OUTPATIENT)
Dept: RADIOLOGY | Facility: HOSPITAL | Age: 55
Discharge: HOME | End: 2025-02-10
Attending: STUDENT IN AN ORGANIZED HEALTH CARE EDUCATION/TRAINING PROGRAM
Payer: COMMERCIAL

## 2025-02-10 VITALS — WEIGHT: 142 LBS | BODY MASS INDEX: 25.15 KG/M2

## 2025-02-10 DIAGNOSIS — Z12.31 ENCOUNTER FOR SCREENING MAMMOGRAM FOR BREAST CANCER: ICD-10-CM

## 2025-02-10 PROCEDURE — 77063 BREAST TOMOSYNTHESIS BI: CPT | Mod: 26,,, | Performed by: RADIOLOGY

## 2025-02-10 PROCEDURE — 77063 BREAST TOMOSYNTHESIS BI: CPT | Mod: TC

## 2025-02-10 PROCEDURE — 77067 SCR MAMMO BI INCL CAD: CPT | Mod: 26,,, | Performed by: RADIOLOGY

## 2025-02-11 DIAGNOSIS — Z12.39 ENCOUNTER FOR SCREENING FOR MALIGNANT NEOPLASM OF BREAST, UNSPECIFIED SCREENING MODALITY: Primary | ICD-10-CM

## 2025-03-07 DIAGNOSIS — E66.9 OBESITY, UNSPECIFIED CLASS, UNSPECIFIED OBESITY TYPE, UNSPECIFIED WHETHER SERIOUS COMORBIDITY PRESENT: ICD-10-CM

## 2025-03-07 RX ORDER — TIRZEPATIDE 10 MG/.5ML
10 INJECTION, SOLUTION SUBCUTANEOUS
Qty: 2 ML | Refills: 0 | Status: ACTIVE | OUTPATIENT
Start: 2025-03-07

## 2025-03-10 ENCOUNTER — PATIENT MESSAGE (OUTPATIENT)
Dept: INTERNAL MEDICINE | Facility: CLINIC | Age: 55
End: 2025-03-10

## 2025-03-10 ENCOUNTER — OFFICE VISIT (OUTPATIENT)
Dept: INTERNAL MEDICINE | Facility: CLINIC | Age: 55
End: 2025-03-10
Payer: COMMERCIAL

## 2025-03-10 PROCEDURE — 99499 UNLISTED E&M SERVICE: CPT | Mod: 95,,,

## 2025-03-10 RX ORDER — TIRZEPATIDE 12.5 MG/.5ML
12.5 INJECTION, SOLUTION SUBCUTANEOUS
Qty: 2 ML | Refills: 3 | Status: ACTIVE | OUTPATIENT
Start: 2025-03-10

## 2025-03-10 NOTE — PROGRESS NOTES
Patient ID: Sameera Olivarez is a 54 y.o. White female    Subjective  Chief Complaint: patient presents for medical weight loss management.    Co-morbidities: none    HPI: Patient continued [facility]ep[facility] with [facility] in July 2024 and is currently managed on Zepbound 10 mg.     Tolerance to current therapy:  Denies nausea, vomiting, diarrhea, constipation, abdominal pain      Weight loss history:  Starting weight: 163 lbs - pt reported  Current weight:    3/10/2025   Recent Readings    Weight (lbs) 142.8 lb    BMI 25.29 BMI      % weight loss since GLP-1 initiation: 12.4 %    Objective  Lab Results   Component Value Date     12/03/2024     06/06/2024     05/03/2023     Lab Results   Component Value Date    K 4.3 12/03/2024    K 4.7 06/06/2024    K 2.9 (L) 05/03/2023     Lab Results   Component Value Date     12/03/2024     06/06/2024     05/03/2023     Lab Results   Component Value Date    CO2 27 12/03/2024    CO2 24 06/06/2024    CO2 24 05/03/2023     Lab Results   Component Value Date    BUN 17 12/03/2024    BUN 14 06/06/2024    BUN 11 05/03/2023     Lab Results   Component Value Date    GLU 82 12/03/2024    GLU 98 06/06/2024    GLU 91 05/03/2023     Lab Results   Component Value Date    CALCIUM 10.5 12/03/2024    CALCIUM 9.9 06/06/2024    CALCIUM 9.5 05/03/2023     Lab Results   Component Value Date    PROT 8.6 (H) 12/03/2024    PROT 7.4 05/03/2023    PROT 8.1 10/12/2022     Lab Results   Component Value Date    ALBUMIN 4.4 12/03/2024    ALBUMIN 3.9 05/03/2023    ALBUMIN 4.3 10/12/2022     Lab Results   Component Value Date    BILITOT 0.5 12/03/2024    BILITOT 0.2 05/03/2023    BILITOT 0.4 10/12/2022     Lab Results   Component Value Date    AST 52 (H) 12/03/2024    AST 18 05/03/2023    AST 20 10/12/2022     Lab Results   Component Value Date    ALT 64 (H) 12/03/2024    ALT 16 05/03/2023    ALT 21 10/12/2022     Lab Results   Component Value Date    ANIONGAP 9  12/03/2024    ANIONGAP 8 06/06/2024    ANIONGAP 7 (L) 05/03/2023     Lab Results   Component Value Date    CREATININE 0.8 12/03/2024    CREATININE 0.8 06/06/2024    CREATININE 0.8 05/03/2023     Lab Results   Component Value Date    EGFRNORACEVR >60.0 12/03/2024    EGFRNORACEVR >60 06/06/2024    EGFRNORACEVR >60.0 05/03/2023       Assessment/Plan  - Continue Zepbound 12.5 SUBQ weekly  - RTC in 3 months for follow-up evaluation    Patient consented to pharmacist management via collaborative practice.

## 2025-03-18 ENCOUNTER — PATIENT MESSAGE (OUTPATIENT)
Dept: ADMINISTRATIVE | Facility: HOSPITAL | Age: 55
End: 2025-03-18
Payer: COMMERCIAL

## 2025-03-24 NOTE — PROGRESS NOTES
DATE: 3/27/2025  PATIENT: Sameera Olivarez    Supervising Physician: Gee Freeman M.D.    CHIEF COMPLAINT: right arm and right leg pain    HISTORY:  Sameera Olivarez is a 54 y.o. female here for initial evaluation of low back and right leg pain (Back - 5, Leg - 5). The pain has been present for 3 months without injury. The patient describes the pain as tightness in her right leg.  The pain is worse with laying on her side and walking and improved by nothing. There is negative associated numbness and tingling. There is negative subjective weakness. Prior treatments have included no PT, ESIs, surgery.    The patient also has complaints of neck and right arm pain (Neck - 5, Arm - 5).  The pain has been present for 3 months. The patient describes the pain as tender and radiating to her 2nd and 3rd fingers. The pain is worse with nothing and improved by nothing. There is positive associated numbness and tingling. There is negative subjective weakness. Prior treatments have included no PT, ESIs, surgery.     The patient denies myelopathic symptoms such as handwriting changes or difficulty with buttons/coins/keys. Denies perineal paresthesias, bowel/bladder dysfunction.    PAST MEDICAL/SURGICAL HISTORY:  Past Medical History:   Diagnosis Date    Anemia     borderline    Encounter for blood transfusion     GERD (gastroesophageal reflux disease)     Sjogren's syndrome      Past Surgical History:   Procedure Laterality Date    BELT ABDOMINOPLASTY  2014     SECTION  2001    COLONOSCOPY N/A 2019    Procedure: COLONOSCOPY;  Surgeon: Pete Soler MD;  Location: [address]);  Service: Endoscopy;  Laterality: N/A;    ECTOPIC PREGNANCY SURGERY      right unsure if tube removed    HYSTERECTOMY      REPAIR OF COLLATERAL LIGAMENT OF THUMB Right 2022    Procedure: REPAIR, LIGAMENT, COLLATERAL, THUMB - right thumb ucl need arthrex;  Surgeon: Manuel Baltazar MD;  Location: [facility];  Service:  "Orthopedics;  Laterality: Right;    TOTAL REDUCTION MAMMOPLASTY  1987    TUBAL LIGATION  2001    XI ROBOTIC HYSTERECTOMY, WITH SALPINGO-OOPHORECTOMY N/A 06/10/2024    Procedure: XI ROBOTIC HYSTERECTOMY,WITH SALPINGO-OOPHORECTOMY;  Surgeon: Baltazar Stack IV, MD;  Location: [facility];  Service: OB/GYN;  Laterality: N/A;       Medications:   Medications Ordered Prior to Encounter[1]    Social History: Social History[2]    REVIEW OF SYSTEMS:  Constitution: Negative. Negative for chills, fever and night sweats.   Cardiovascular: Negative for chest pain and syncope.   Respiratory: Negative for cough and shortness of breath.   Gastrointestinal: See HPI. Negative for nausea/vomiting. Negative for abdominal pain.  Genitourinary: See HPI. Negative for discoloration or dysuria.  Skin: Negative for dry skin, itching and rash.   Hematologic/Lymphatic: Negative for bleeding problem. Does not bruise/bleed easily.   Musculoskeletal: Negative for falls and muscle weakness.   Neurological: See HPI. No seizures.   Endocrine: Negative for polydipsia, polyphagia and polyuria.   Allergic/Immunologic: Negative for hives and persistent infections.     EXAM:  Ht 5' 3" (1.6 m)   Wt 66.7 kg (147 lb 2.5 oz)   LMP 05/06/2022   BMI 26.07 kg/m²     PHYSICAL EXAMINATION:    General: The patient is a very pleasant 54 y.o. female in no apparent distress, the patient is oriented to person, place and time.  Psych: Normal mood and affect  HEENT: Vision grossly intact, hearing intact to the spoken word.  Lungs: Respirations unlabored.  Gait: Normal station and gait, no difficulty with toe or heel walk.   Skin: Cervical skin and dorsal lumbar skin negative for rashes, lesions, hairy patches and surgical scars.    Range of motion: Cervical and lumbar range of motion is acceptable. There is negative tenderness to palpation of the paracervical muscles.  There is negative lumbar tenderness to palpation.  Spinal Balance: Global saggital and coronal " spinal balance acceptable, no significant for scoliosis and kyphosis.  Musculoskeletal: No pain with the range of motion of the bilateral shoulders and elbows. Normal bulk and contour of the bilateral hands.  No pain with the range of motion of the bilateral hips. Mild bilateral trochanteric tenderness to palpation.  Vascular: Bilateral upper and lower extremities warm and well perfused, radial pulses 2+ bilaterally, dorsalis pedis pulses 2+ bilaterally.  Neurological: Normal strength and tone in all major motor groups in the bilateral upper and lower extremities. Normal sensation to light touch in the C5-T1 and L2-S1 dermatomes bilaterally.  Deep tendon reflexes symmetric 2+ in the bilateral upper and lower extremities.  Negative Inverted Radial Reflex and Ortiz's bilaterally. Negative Babinski bilaterally. Negative straight leg raise bilaterally.     IMAGING:   Today I personally reviewed AP, Lat and Flex/Ex  upright C-spine films that demonstrate mild degenerative changes.    AP, Lat and Flex/Ex upright lumbar spine films demonstrate mild degenerative changes.     Body mass index is 26.07 kg/m².    Hemoglobin A1C   Date Value Ref Range Status   12/03/2024 4.7 4.0 - 5.6 % Final     Comment:     ADA Screening Guidelines:  5.7-6.4%  Consistent with prediabetes  >or=6.5%  Consistent with diabetes    High levels of fetal hemoglobin interfere with the HbA1C  assay. Heterozygous hemoglobin variants (HbS, HgC, etc)do  not significantly interfere with this assay.   However, presence of multiple variants may affect accuracy.     12/15/2023 5.4 4.0 - 5.6 % Final     Comment:     ADA Screening Guidelines:  5.7-6.4%  Consistent with prediabetes  >or=6.5%  Consistent with diabetes    High levels of fetal hemoglobin interfere with the HbA1C  assay. Heterozygous hemoglobin variants (HbS, HgC, etc)do  not significantly interfere with this assay.   However, presence of multiple variants may affect accuracy.     02/09/2022 5.1  4.0 - 5.6 % Final     Comment:     ADA Screening Guidelines:  5.7-6.4%  Consistent with prediabetes  >or=6.5%  Consistent with diabetes    High levels of fetal hemoglobin interfere with the HbA1C  assay. Heterozygous hemoglobin variants (HbS, HgC, etc)do  not significantly interfere with this assay.   However, presence of multiple variants may affect accuracy.           ASSESSMENT/PLAN:    There are no diagnoses linked to this encounter.    Today we discussed at length all of the different treatment options including anti-inflammatories, acetaminophen, rest, ice, heat, physical therapy including strengthening and stretching exercises, home exercises, ROM, aerobic conditioning, aqua therapy, other modalities including ultrasound, massage, and dry needling, epidural steroid injections and finally surgical intervention.      Pt presents with chronic cervical and lumbar radiculopathy. Will send gabapentin to pharmacy and enroll in the [employer] healthy back program. I have provided the patient with a home exercise program. It is the [employer] cervical exercise program. Exercises include walking erectly with neutral head position, supine neutral head position, supine retraction, sitting or standing neck retraction, posture training, forward/backward/sideward isometric strengthening, prone head lifts, supine head lifts, scapular retraction, and neck rotation. Pt will complete each exercise twice daily for 6-8 weeks. I provided the patient with a home exercise program. It is the [employer] spine conditioning program. Exercises include head rolls, kneeling back extension, sitting rotation stretch, modified seated side straddle, knee to chest, bird dog, plank, modified seated plank, hip bridges, abdominal bracing, and abdominal crunch. Pt will complete each exercise 5 times daily for 6-8 weeks. Pt will fu if pain persists.          [1]   Current Outpatient Medications on File Prior to Visit   Medication Sig  "Dispense Refill    estradioL (ESTRACE) 0.01 % (0.1 mg/gram) vaginal cream Insert 0.5 grams with applicator or dime-sized amount with finger in vagina nightly for  2 weeks, then twice a week thereafter 42.5 g 11    estradioL (VIVELLE-DOT) 0.1 mg/24 hr PTSW Place 1 patch onto the skin Every 3 (three) days. 12 patch 6    finasteride (PROPECIA) 1 mg tablet Take 1 tablet (1 mg total) by mouth once daily. 90 tablet 2    hydroCHLOROthiazide (HYDRODIURIL) 25 MG tablet Take 1 tablet (25 mg total) by mouth once daily. 90 tablet 3    metroNIDAZOLE (NORITATE) 1 % cream Compound azelaic acid 15% + ivermectin 1% + metronidazole 1% cream. Apply to face once or twice daily. 30 g 6    progesterone (PROMETRIUM) 100 MG capsule Take 1 capsule (100 mg total) by mouth nightly. 30 capsule 12    RHOFADE 1 % Crea AAA face qday for redness 30 g 6    syringe with needle 3 mL 22 x 1 1/2" Syrg Use 1 syringe every 30 days 12 each 0    testosterone cypionate (DEPOTESTOTERONE CYPIONATE) 200 mg/mL injection Inject 0.25 mLs (50 mg total) into the muscle every 28 days. 1 mL 1    tirzepatide, weight loss, (ZEPBOUND) 12.5 mg/0.5 mL PnIj Inject 12.5 mg into the skin every 7 days. 2 mL 3    triamcinolone acetonide 0.1% (KENALOG) 0.1 % cream Apply topically 2 (two) times daily as needed (rash). 15 g 3     No current facility-administered medications on file prior to visit.   [2]   Social History  Socioeconomic History    Marital status:    Tobacco Use    Smoking status: Former     Current packs/day: 0.00     Types: Cigarettes     Quit date:      Years since quittin.2    Smokeless tobacco: Never    Tobacco comments:     quit 20 years ago   Substance and Sexual Activity    Alcohol use: Yes     Alcohol/week: 2.0 standard drinks of alcohol     Types: 2 Glasses of wine per week     Comment: weekends    Drug use: No    Sexual activity: Yes     Partners: Male     Birth control/protection: See Surgical Hx     Comment:  to wKame Yousif 30 "   Other Topics Concern    Are you pregnant or think you may be? No    Breast-feeding No     Social Drivers of Health     Financial Resource Strain: Low Risk  (3/26/2025)    Overall Financial Resource Strain (CARDIA)     Difficulty of Paying Living Expenses: Not hard at all   Food Insecurity: No Food Insecurity (3/26/2025)    Hunger Vital Sign     Worried About Running Out of Food in the Last Year: Never true     Ran Out of Food in the Last Year: Never true   Transportation Needs: No Transportation Needs (3/26/2025)    PRAPARE - Transportation     Lack of Transportation (Medical): No     Lack of Transportation (Non-Medical): No   Physical Activity: Insufficiently Active (3/26/2025)    Exercise Vital Sign     Days of Exercise per Week: 3 days     Minutes of Exercise per Session: 20 min   Stress: No Stress Concern Present (3/26/2025)    [employer] - Occupational Stress Questionnaire     Feeling of Stress : Not at all   Housing Stability: Low Risk  (3/26/2025)    Housing Stability Vital Sign     Unable to Pay for Housing in the Last Year: No     Number of Times Moved in the Last Year: 0     Homeless in the Last Year: No

## 2025-03-26 DIAGNOSIS — M50.30 DDD (DEGENERATIVE DISC DISEASE), CERVICAL: Primary | ICD-10-CM

## 2025-03-26 DIAGNOSIS — M51.369 DEGENERATION OF INTERVERTEBRAL DISC OF LUMBAR REGION, UNSPECIFIED WHETHER PAIN PRESENT: ICD-10-CM

## 2025-03-27 ENCOUNTER — HOSPITAL ENCOUNTER (OUTPATIENT)
Dept: RADIOLOGY | Facility: HOSPITAL | Age: 55
Discharge: HOME | End: 2025-03-27
Attending: ORTHOPAEDIC SURGERY
Payer: COMMERCIAL

## 2025-03-27 ENCOUNTER — OFFICE VISIT (OUTPATIENT)
Dept: ORTHOPEDICS | Facility: CLINIC | Age: 55
End: 2025-03-27
Payer: COMMERCIAL

## 2025-03-27 ENCOUNTER — PATIENT MESSAGE (OUTPATIENT)
Dept: ORTHOPEDICS | Facility: CLINIC | Age: 55
End: 2025-03-27

## 2025-03-27 VITALS — HEIGHT: 63 IN | BODY MASS INDEX: 26.08 KG/M2 | WEIGHT: 147.19 LBS

## 2025-03-27 DIAGNOSIS — M50.30 DDD (DEGENERATIVE DISC DISEASE), CERVICAL: ICD-10-CM

## 2025-03-27 DIAGNOSIS — M50.30 DDD (DEGENERATIVE DISC DISEASE), CERVICAL: Primary | ICD-10-CM

## 2025-03-27 DIAGNOSIS — M51.369 DEGENERATION OF INTERVERTEBRAL DISC OF LUMBAR REGION, UNSPECIFIED WHETHER PAIN PRESENT: ICD-10-CM

## 2025-03-27 PROCEDURE — 72110 X-RAY EXAM L-2 SPINE 4/>VWS: CPT | Mod: 26,,, | Performed by: RADIOLOGY

## 2025-03-27 PROCEDURE — 99999 PR PBB SHADOW E&M-EST. PATIENT-LVL III: CPT | Mod: PBBFAC,,, | Performed by: ORTHOPAEDIC SURGERY

## 2025-03-27 PROCEDURE — 72050 X-RAY EXAM NECK SPINE 4/5VWS: CPT | Mod: TC

## 2025-03-27 PROCEDURE — 72050 X-RAY EXAM NECK SPINE 4/5VWS: CPT | Mod: 26,,, | Performed by: RADIOLOGY

## 2025-03-27 PROCEDURE — 72110 X-RAY EXAM L-2 SPINE 4/>VWS: CPT | Mod: TC

## 2025-03-27 RX ORDER — GABAPENTIN 300 MG/1
300 CAPSULE ORAL 3 TIMES DAILY
Qty: 90 CAPSULE | Refills: 11 | Status: SHIPPED | OUTPATIENT
Start: 2025-03-27 | End: 2026-03-27

## 2025-03-28 ENCOUNTER — PATIENT OUTREACH (OUTPATIENT)
Dept: OTHER | Facility: OTHER | Age: 55
End: 2025-03-28
Payer: COMMERCIAL

## 2025-03-31 ENCOUNTER — OFFICE VISIT (OUTPATIENT)
Dept: GASTROENTEROLOGY | Facility: CLINIC | Age: 55
End: 2025-03-31
Payer: COMMERCIAL

## 2025-03-31 DIAGNOSIS — K21.9 GASTROESOPHAGEAL REFLUX DISEASE, UNSPECIFIED WHETHER ESOPHAGITIS PRESENT: Primary | ICD-10-CM

## 2025-03-31 DIAGNOSIS — Z12.11 SCREENING FOR COLON CANCER: ICD-10-CM

## 2025-03-31 DIAGNOSIS — R05.9 COUGH, UNSPECIFIED TYPE: ICD-10-CM

## 2025-03-31 RX ORDER — OMEPRAZOLE 40 MG/1
40 CAPSULE, DELAYED RELEASE ORAL DAILY
Qty: 90 CAPSULE | Refills: 3 | Status: SHIPPED | OUTPATIENT
Start: 2025-03-31 | End: 2026-03-31

## 2025-03-31 RX ORDER — FAMOTIDINE 40 MG/1
40 TABLET, FILM COATED ORAL NIGHTLY PRN
Qty: 30 TABLET | Refills: 11 | Status: SHIPPED | OUTPATIENT
Start: 2025-03-31 | End: 2026-03-31

## 2025-03-31 NOTE — PROGRESS NOTES
The patient location is: Work  The chief complaint leading to consultation is: GERD with cough, colonoscopy     Visit type: audiovisual    Face to Face time with patient: 7 minutes  20 minutes of total time spent on the encounter, which includes face to face time and non-face to face time preparing to see the patient (eg, review of tests), Obtaining and/or reviewing separately obtained history, Documenting clinical information in the electronic or other health record, Independently interpreting results (not separately reported) and communicating results to the patient/family/caregiver, or Care coordination (not separately reported).     Each patient to whom he or she provides medical services by telemedicine is:  (1) informed of the relationship between the physician and patient and the respective role of any other health care provider with respect to management of the patient; and (2) notified that he or she may decline to receive medical services by telemedicine and may withdraw from such care at any time.                                                                                 Gastroenterology Progress Note    Reason for Visit:  The primary encounter diagnosis was Gastroesophageal reflux disease, unspecified whether esophagitis present. Diagnoses of Cough, unspecified type and Screening for colon cancer were also pertinent to this visit.    PCP:   Sameera Parker         Initial HPI   This is a 54 y.o. female presenting for GERD with cough. She reports taking Nexium 20mg once daily. She has been taking this for a couple of months. Patient states that she developed a cough several weeks ago. Nexium has not improved this. She denies odynophagia, dysphagia, abdominal pain.     Patient with colonoscopy 2017. She is interested in getting her screening colonoscopy given her extensive FH of multiple different cancers. Mother with breast cancer as well as other relatives.     ROS:  Review of Systems    Constitutional:  Negative for chills, fever, malaise/fatigue and weight loss.   HENT:  Negative for sore throat.    Eyes:  Negative for redness.   Gastrointestinal:  Positive for heartburn. Negative for abdominal pain, blood in stool, constipation, diarrhea, melena, nausea and vomiting.   Skin:  Negative for rash.   Neurological:  Negative for dizziness, loss of consciousness and weakness.        Medical History:  has a past medical history of Anemia, Encounter for blood transfusion (), GERD (gastroesophageal reflux disease), and Sjogren's syndrome.    Surgical History:  has a past surgical history that includes  section (); Ectopic pregnancy surgery (); Total Reduction Mammoplasty (); Colonoscopy (N/A, 2019); Belt abdominoplasty (); Tubal ligation (); Repair of collateral ligament of thumb (Right, 2022); xi robotic hysterectomy, with salpingo-oophorectomy (N/A, 06/10/2024); and Hysterectomy.    Family History: family history includes Achalasia in her paternal grandmother; Breast cancer (age of onset: 65) in her maternal aunt; Breast cancer (age of onset: 72) in her mother; Cerebral aneurysm in her maternal grandmother; Heart disease in her maternal uncle and paternal aunt; Heart disease (age of onset: 52) in her maternal grandfather; Heart disease (age of onset: 60) in her father; Lung cancer in her maternal uncle; Ovarian cancer (age of onset: 60) in her maternal aunt; Prostate cancer in her maternal uncle; Sjogren's syndrome in her paternal grandfather..       Review of patient's allergies indicates:   Allergen Reactions    Melon Itching       Medications Ordered Prior to Encounter[1]      Objective Findings:    Vital Signs:  LMP 2022   There is no height or weight on file to calculate BMI.    Physical Exam:  Physical Exam  Neurological:      Mental Status: She is alert.             Labs:  Lab Results   Component Value Date    WBC 6.75 2024    HGB 16.1 (H)  12/03/2024    HCT 47.0 12/03/2024     12/03/2024    CRP 1.4 05/03/2023    CHOL 252 (H) 12/03/2024    TRIG 231 (H) 12/03/2024    HDL 56 12/03/2024    ALKPHOS 161 (H) 12/03/2024    ALT 64 (H) 12/03/2024    AST 52 (H) 12/03/2024     12/03/2024    K 4.3 12/03/2024     12/03/2024    CREATININE 0.8 12/03/2024    BUN 17 12/03/2024    CO2 27 12/03/2024    TSH 2.760 05/03/2023    INR 1.0 10/12/2022    HGBA1C 4.7 12/03/2024         Imaging reviewed: No pertinent imaging reviewed       Endoscopy reviewed: EGD 12/20/2017  Impression:           - LA Grade B reflux esophagitis.                         - Normal stomach.                         - Normal examined duodenum.                         - No specimens collected.   Recommendation:       - Discharge patient to home.                         - Patient has a contact number available for                         emergencies. The signs and symptoms of potential                         delayed complications were discussed with the                         patient. Return to normal activities tomorrow.                         Written discharge instructions were provided to the                         patient.                         - Resume previous diet.                         - Continue present medications.   Attending Participation:        I personally performed the entire procedure.   Tita Sanchez MD   12/20/2017 8:38:43 AM     Colonoscopy 3/23/2017  Impression:           - The examined portion of the ileum was normal.                         - Non-bleeding internal hemorrhoids.                         - Diverticulosis in the sigmoid colon.                         - The entire examined colon is normal.                         - No specimens collected.   Recommendation:       - Discharge patient to home.                         - Repeat colonoscopy in 10 years for screening                         purposes.                         - THIS RECOMMENDATION of  10-Years FOR NEXT                         SCREENING COLONOSCOPY ASSUMES THAT YOU WILL NOT                         HAVE HAD OR NOT HAD A FIRST OR SECOND DEGREE                         RELATIVE/S WITH COLORECTAL CANCER OR AN ADVANCE                         COLON ADENOMAS BEFORE THE AGE OF 60 YEARS OF AGE OF                         THOSE INDIVIDUALS BY THE TIME OF YOUR NEXT                         SCREENING COLONOSCOPY.                         - Return to primary care physician.                         - The findings and recommendations were discussed                         with the patient.   Attending Participation:        I personally performed the entire procedure.   Pete Soler MD   3/23/2019 7:59:09 AM     Assessment:  1. Gastroesophageal reflux disease, unspecified whether esophagitis present    2. Cough, unspecified type    3. Screening for colon cancer             Recommendations:  Changed patient to Prilosec. Recommended EGD with Bravo OFF PPI. Patient denies allergy to any metals.   Prilosec 40mg once daily. Pepcid 40mg nightly. EGD with bravo OFF PPI for 96 hours.  Ref for colonoscopy.       Thank you for allowing me to participate in this patient's care.    Sincerely,     Rachel Colon NP  Gastroenterology Department  [facility]            [1]   Current Outpatient Medications on File Prior to Visit   Medication Sig Dispense Refill    estradioL (ESTRACE) 0.01 % (0.1 mg/gram) vaginal cream Insert 0.5 grams with applicator or dime-sized amount with finger in vagina nightly for  2 weeks, then twice a week thereafter 42.5 g 11    estradioL (VIVELLE-DOT) 0.1 mg/24 hr PTSW Place 1 patch onto the skin Every 3 (three) days. 12 patch 6    finasteride (PROPECIA) 1 mg tablet Take 1 tablet (1 mg total) by mouth once daily. 90 tablet 2    gabapentin (NEURONTIN) 300 MG capsule Take 1 capsule (300 mg total) by mouth 3 (three) times daily. 90 capsule 11    hydroCHLOROthiazide (HYDRODIURIL) 25 MG tablet Take 1  "tablet (25 mg total) by mouth once daily. 90 tablet 3    metroNIDAZOLE (NORITATE) 1 % cream Compound azelaic acid 15% + ivermectin 1% + metronidazole 1% cream. Apply to face once or twice daily. 30 g 6    progesterone (PROMETRIUM) 100 MG capsule Take 1 capsule (100 mg total) by mouth nightly. 30 capsule 12    RHOFADE 1 % Crea AAA face qday for redness 30 g 6    syringe with needle 3 mL 22 x 1 1/2" Syrg Use 1 syringe every 30 days 12 each 0    testosterone cypionate (DEPOTESTOTERONE CYPIONATE) 200 mg/mL injection Inject 0.25 mLs (50 mg total) into the muscle every 28 days. 1 mL 1    tirzepatide, weight loss, (ZEPBOUND) 12.5 mg/0.5 mL PnIj Inject 12.5 mg into the skin every 7 days. 2 mL 3    triamcinolone acetonide 0.1% (KENALOG) 0.1 % cream Apply topically 2 (two) times daily as needed (rash). 15 g 3     No current facility-administered medications on file prior to visit.     "

## 2025-04-01 ENCOUNTER — PATIENT OUTREACH (OUTPATIENT)
Dept: OTHER | Facility: OTHER | Age: 55
End: 2025-04-01
Payer: COMMERCIAL

## 2025-04-04 ENCOUNTER — PATIENT OUTREACH (OUTPATIENT)
Dept: OTHER | Facility: OTHER | Age: 55
End: 2025-04-04
Payer: COMMERCIAL

## 2025-04-04 NOTE — PROGRESS NOTES
[facility]: Patient Outreach    Welcome Call - Spoke with pt on 4/4.        Patient Outreach from 4/4/2025 in [employer]    4/4/2025    1202       Is there anything you'd like me to know before you get started? No   Does any position or activity typically cause you pain? No   When do you plan to start the program. 4/1/2025

## 2025-04-08 ENCOUNTER — PATIENT MESSAGE (OUTPATIENT)
Dept: ORTHOPEDICS | Facility: CLINIC | Age: 55
End: 2025-04-08
Payer: COMMERCIAL

## 2025-04-08 ENCOUNTER — PATIENT MESSAGE (OUTPATIENT)
Dept: ADMINISTRATIVE | Facility: OTHER | Age: 55
End: 2025-04-08
Payer: COMMERCIAL

## 2025-04-16 ENCOUNTER — PATIENT MESSAGE (OUTPATIENT)
Dept: ADMINISTRATIVE | Facility: OTHER | Age: 55
End: 2025-04-16
Payer: COMMERCIAL

## 2025-04-21 DIAGNOSIS — L65.9 HAIR LOSS: ICD-10-CM

## 2025-04-21 RX ORDER — FINASTERIDE 1 MG/1
1 TABLET, FILM COATED ORAL DAILY
Qty: 90 TABLET | Refills: 1 | Status: SHIPPED | OUTPATIENT
Start: 2025-04-21

## 2025-04-21 NOTE — TELEPHONE ENCOUNTER
Refill Decision Note   Sameera Olivarez  is requesting a refill authorization.  Brief Assessment and Rationale for Refill:  Approve     Medication Therapy Plan:         Comments:     Note composed:6:38 PM 04/21/2025

## 2025-04-29 ENCOUNTER — PATIENT MESSAGE (OUTPATIENT)
Dept: GASTROENTEROLOGY | Facility: CLINIC | Age: 55
End: 2025-04-29
Payer: COMMERCIAL

## 2025-04-29 ENCOUNTER — PATIENT MESSAGE (OUTPATIENT)
Dept: ADMINISTRATIVE | Facility: OTHER | Age: 55
End: 2025-04-29
Payer: COMMERCIAL

## 2025-04-29 ENCOUNTER — TELEPHONE (OUTPATIENT)
Dept: ENDOSCOPY | Facility: HOSPITAL | Age: 55
End: 2025-04-29
Payer: COMMERCIAL

## 2025-04-29 ENCOUNTER — PATIENT OUTREACH (OUTPATIENT)
Dept: OTHER | Facility: OTHER | Age: 55
End: 2025-04-29
Payer: COMMERCIAL

## 2025-04-29 VITALS — BODY MASS INDEX: 24.8 KG/M2 | HEIGHT: 63 IN | WEIGHT: 140 LBS

## 2025-04-29 DIAGNOSIS — K21.9 GASTROESOPHAGEAL REFLUX DISEASE, UNSPECIFIED WHETHER ESOPHAGITIS PRESENT: ICD-10-CM

## 2025-04-29 DIAGNOSIS — Z12.11 SCREEN FOR COLON CANCER: Primary | ICD-10-CM

## 2025-04-29 DIAGNOSIS — Z86.0100 HISTORY OF COLON POLYPS: ICD-10-CM

## 2025-04-29 DIAGNOSIS — K21.9 GASTROESOPHAGEAL REFLUX DISEASE, UNSPECIFIED WHETHER ESOPHAGITIS PRESENT: Primary | ICD-10-CM

## 2025-04-29 RX ORDER — FAMOTIDINE 40 MG/1
40 TABLET, FILM COATED ORAL 2 TIMES DAILY PRN
Qty: 60 TABLET | Refills: 11 | Status: SHIPPED | OUTPATIENT
Start: 2025-04-29 | End: 2026-04-29

## 2025-04-29 RX ORDER — SODIUM, POTASSIUM,MAG SULFATES 17.5-3.13G
1 SOLUTION, RECONSTITUTED, ORAL ORAL DAILY
Qty: 1 KIT | Refills: 0 | Status: SHIPPED | OUTPATIENT
Start: 2025-06-09 | End: 2025-06-11

## 2025-04-29 NOTE — TELEPHONE ENCOUNTER
"----- Message from Chikis sent at 4/29/2025 11:28 AM CDT -----  Regarding: FW: EGD with Bravo OFF PPI/Colonoscopy    ----- Message -----  From: Rachel Colon NP  Sent: 4/29/2025  10:03 AM CDT  To: [facility]  Subject: EGD with Bravo OFF PPI/Colonoscopy               Procedure: EGD w/Bravo OFF PPI for 96 hours & Colonoscopy Diagnosis: Surveillance colonoscopy - Hx of colon polyps, GERD, and coughProcedure Timing: Within 4 weeks (Urgent)#If within 4 weeks selected, please veronika as high priority##If greater than 12 weeks, please select "5-12 weeks" and delay sending until 3 months prior to requested date# Location: Any SiteAdditional Scheduling Information: No scheduling concernsPrep Specifications:Standard prepIs the patient taking a GLP-1 Agonist:noHave you attached a patient to this message: yes  "

## 2025-04-29 NOTE — TELEPHONE ENCOUNTER
[facility]      Spoke to Sameera to schedule procedure(s) Colonoscopy/EGD       Physician to perform procedure(s) Dr. CHI Yousif  Date of Procedure (s) 6/12/25  Arrival Time 7:30 AM  Time of Procedure(s) 8:30 AM   Location of Procedure(s) [facility]  Type of Rx Prep sent to patient: Suprep  Instructions provided to [facility] via [facility]    Patient was informed on the following information and verbalized understanding. Screening questionnaire reviewed with patient and complete. If procedure requires anesthesia, a responsible adult needs to be present to accompany the patient home, patient cannot drive after receiving anesthesia. Appointment details are tentative, especially check-in time. Patient will receive a prep-op call 7 days prior to confirm check-in time for procedure. If applicable the patient should contact their pharmacy to verify Rx for procedure prep is ready for pick-up. Patient was advised to call the scheduling department at [phone] if pharmacy states no Rx is available. Patient was advised to call the endoscopy scheduling department if any questions or concerns arise.       Endoscopy Scheduling Department

## 2025-05-01 ENCOUNTER — PATIENT MESSAGE (OUTPATIENT)
Dept: ADMINISTRATIVE | Facility: OTHER | Age: 55
End: 2025-05-01
Payer: COMMERCIAL

## 2025-05-12 ENCOUNTER — PATIENT OUTREACH (OUTPATIENT)
Dept: OTHER | Facility: OTHER | Age: 55
End: 2025-05-12
Payer: COMMERCIAL

## 2025-05-12 ENCOUNTER — PATIENT MESSAGE (OUTPATIENT)
Dept: ORTHOPEDICS | Facility: CLINIC | Age: 55
End: 2025-05-12
Payer: COMMERCIAL

## 2025-05-12 ENCOUNTER — PATIENT MESSAGE (OUTPATIENT)
Dept: ADMINISTRATIVE | Facility: OTHER | Age: 55
End: 2025-05-12
Payer: COMMERCIAL

## 2025-05-14 ENCOUNTER — LAB VISIT (OUTPATIENT)
Dept: LAB | Facility: HOSPITAL | Age: 55
End: 2025-05-14
Payer: COMMERCIAL

## 2025-05-14 ENCOUNTER — RESULTS FOLLOW-UP (OUTPATIENT)
Dept: INTERNAL MEDICINE | Facility: CLINIC | Age: 55
End: 2025-05-14

## 2025-05-14 ENCOUNTER — OFFICE VISIT (OUTPATIENT)
Dept: INTERNAL MEDICINE | Facility: CLINIC | Age: 55
End: 2025-05-14
Payer: COMMERCIAL

## 2025-05-14 VITALS
DIASTOLIC BLOOD PRESSURE: 60 MMHG | HEART RATE: 82 BPM | BODY MASS INDEX: 25.78 KG/M2 | HEIGHT: 63 IN | OXYGEN SATURATION: 98 % | WEIGHT: 145.5 LBS | SYSTOLIC BLOOD PRESSURE: 115 MMHG

## 2025-05-14 DIAGNOSIS — Z00.00 ANNUAL PHYSICAL EXAM: Primary | ICD-10-CM

## 2025-05-14 DIAGNOSIS — R74.8 ELEVATED LIVER ENZYMES: ICD-10-CM

## 2025-05-14 LAB
ALBUMIN SERPL BCP-MCNC: 4.1 G/DL (ref 3.5–5.2)
ALP SERPL-CCNC: 106 UNIT/L (ref 40–150)
ALT SERPL W/O P-5'-P-CCNC: 36 UNIT/L (ref 10–44)
ANION GAP (OHS): 10 MMOL/L (ref 8–16)
AST SERPL-CCNC: 32 UNIT/L (ref 11–45)
BILIRUB SERPL-MCNC: 0.7 MG/DL (ref 0.1–1)
BUN SERPL-MCNC: 9 MG/DL (ref 6–20)
CALCIUM SERPL-MCNC: 9.9 MG/DL (ref 8.7–10.5)
CHLORIDE SERPL-SCNC: 102 MMOL/L (ref 95–110)
CO2 SERPL-SCNC: 24 MMOL/L (ref 23–29)
CREAT SERPL-MCNC: 0.8 MG/DL (ref 0.5–1.4)
GFR SERPLBLD CREATININE-BSD FMLA CKD-EPI: >60 ML/MIN/1.73/M2
GLUCOSE SERPL-MCNC: 82 MG/DL (ref 70–110)
POTASSIUM SERPL-SCNC: 4.3 MMOL/L (ref 3.5–5.1)
PROT SERPL-MCNC: 8 GM/DL (ref 6–8.4)
SODIUM SERPL-SCNC: 136 MMOL/L (ref 136–145)

## 2025-05-14 PROCEDURE — 80053 COMPREHEN METABOLIC PANEL: CPT

## 2025-05-14 PROCEDURE — 3008F BODY MASS INDEX DOCD: CPT | Mod: CPTII,S$GLB,, | Performed by: INTERNAL MEDICINE

## 2025-05-14 PROCEDURE — 3074F SYST BP LT 130 MM HG: CPT | Mod: CPTII,S$GLB,, | Performed by: INTERNAL MEDICINE

## 2025-05-14 PROCEDURE — 99396 PREV VISIT EST AGE 40-64: CPT | Mod: S$GLB,,, | Performed by: INTERNAL MEDICINE

## 2025-05-14 PROCEDURE — 99999 PR PBB SHADOW E&M-EST. PATIENT-LVL III: CPT | Mod: PBBFAC,,, | Performed by: INTERNAL MEDICINE

## 2025-05-14 PROCEDURE — 3078F DIAST BP <80 MM HG: CPT | Mod: CPTII,S$GLB,, | Performed by: INTERNAL MEDICINE

## 2025-05-14 PROCEDURE — 36415 COLL VENOUS BLD VENIPUNCTURE: CPT

## 2025-05-14 NOTE — PROGRESS NOTES
"Subjective:       Patient ID: Sameera Olivarez is a 55 y.o. female.    Chief Complaint: Establish Care    HPI      Presents to establish care.     Feeling well today. No acute complaints.     Had labs in December that showed elevated LFTs. Denies any RUQ pain.     PMHx"    GERD: on omeprazole daily and Pepcid as needed. Recently increase to pepcid twice daily. If this does not improve will get EGD per GI.     Menopausal symptoms: well controlled on estradiol and progesterone.     HTN: well controlled with HCTZ.     Weight management on GLP1 through digital medicine program and tolerating medication well.     Health Maintenance:  Colon Cancer Screening: normal colonoscopy in 2019. Due again in 2029.   Mammogram: Normal February 2025   HIV: negative 2021   Hep C: negative 2021   Lipids: slightly elevated 2024.   Pap Smear: s/p hysterectomy   Vaccines:  up to date.           Review of Systems   Constitutional:  Negative for activity change and unexpected weight change.   HENT:  Negative for hearing loss, rhinorrhea and trouble swallowing.    Eyes:  Negative for discharge and visual disturbance.   Respiratory:  Negative for chest tightness and wheezing.    Cardiovascular:  Negative for chest pain and palpitations.   Gastrointestinal:  Negative for blood in stool, constipation, diarrhea and vomiting.   Endocrine: Negative for polydipsia and polyuria.   Genitourinary:  Negative for difficulty urinating, dysuria, hematuria and menstrual problem.   Musculoskeletal:  Positive for arthralgias. Negative for joint swelling and neck pain.   Neurological:  Negative for weakness and headaches.   Psychiatric/Behavioral:  Negative for confusion and dysphoric mood.            Past Medical History:   Diagnosis Date    Anemia     borderline    Encounter for blood transfusion 2001    GERD (gastroesophageal reflux disease)     Sjogren's syndrome      Past Surgical History:   Procedure Laterality Date    BELT ABDOMINOPLASTY  2014    "  SECTION      COLONOSCOPY N/A 2019    Procedure: COLONOSCOPY;  Surgeon: Pete Soler MD;  Location: [facility] ([address] [place] [address]);  Service: Endoscopy;  Laterality: N/A;    ECTOPIC PREGNANCY SURGERY      right unsure if tube removed    HYSTERECTOMY      REPAIR OF COLLATERAL LIGAMENT OF THUMB Right 2022    Procedure: REPAIR, LIGAMENT, COLLATERAL, THUMB - right thumb ucl need arthrex;  Surgeon: Manuel Baltazar MD;  Location: [facility] OR;  Service: Orthopedics;  Laterality: Right;    TOTAL REDUCTION MAMMOPLASTY  1987    TUBAL LIGATION      XI ROBOTIC HYSTERECTOMY, WITH SALPINGO-OOPHORECTOMY N/A 06/10/2024    Procedure: XI ROBOTIC HYSTERECTOMY,WITH SALPINGO-OOPHORECTOMY;  Surgeon: Baltazar Stack IV, MD;  Location: [facility] OR;  Service: OB/GYN;  Laterality: N/A;      Problem List[1]     Objective:      Physical Exam  Constitutional:       Appearance: Normal appearance.   HENT:      Head: Normocephalic.      Right Ear: Tympanic membrane normal.      Left Ear: Tympanic membrane normal.      Nose: Nose normal.      Mouth/Throat:      Mouth: Mucous membranes are dry.   Cardiovascular:      Rate and Rhythm: Normal rate and regular rhythm.      Pulses: Normal pulses.      Heart sounds: Normal heart sounds.   Pulmonary:      Effort: Pulmonary effort is normal.      Breath sounds: Normal breath sounds.   Abdominal:      General: Abdomen is flat. Bowel sounds are normal.      Palpations: Abdomen is soft.   Musculoskeletal:         General: Normal range of motion.      Cervical back: Normal range of motion and neck supple.   Skin:     General: Skin is warm and dry.   Neurological:      General: No focal deficit present.      Mental Status: She is alert and oriented to person, place, and time.   Psychiatric:         Mood and Affect: Mood normal.         Assessment:       Problem List Items Addressed This Visit          GI    Elevated liver enzymes    Relevant Orders    Comprehensive Metabolic Panel  "(Completed)     Other Visit Diagnoses         Annual physical exam    -  Primary            Plan:         Sameera Brasher" was seen today for establish care.    Diagnoses and all orders for this visit:    Annual physical exam  Colon Cancer Screening: normal colonoscopy in 2019. Due again in 2029.   Mammogram: Normal February 2025   HIV: negative 2021   Hep C: negative 2021   Lipids: slightly elevated 2024.   Pap Smear: s/p hysterectomy   Vaccines:  up to date.       Annual wellness exam completed.     All medications, histories, and concerns reviewed, reconciled, and addressed.     Appropriate Screenings per pt's sex and age have been reviewed and discussed with pt.     BMI reviewed.   Elevated liver enzymes  -     Comprehensive Metabolic Panel; Future  Had labs in December that showed elevated LFTs. Denies any RUQ pain.   Will check again today.                Renetta Thomas MD   Internal Medicine   Primary Care           [1]   Patient Active Problem List  Diagnosis    Sjogren syndrome, unspecified    Hip pain, bilateral    Abdominal pain, epigastric    Screening for colon cancer    Breast cancer screening, high risk patient    Pelvic floor dysfunction    Myalgia of pelvic floor    Vaginal atrophy    Bladder pain    Chronic constipation    Right hand pain    Rupture of ulnar collateral ligament of right thumb    Preoperative evaluation to rule out surgical contraindication    GERD (gastroesophageal reflux disease)    Chronic idiopathic urticaria    Xerosis cutis    S/p RA-TLH/BSO    Influenza A    Elevated liver enzymes    Body mass index 25.0-25.9, adult     "

## 2025-05-27 ENCOUNTER — TELEPHONE (OUTPATIENT)
Dept: INTERNAL MEDICINE | Facility: CLINIC | Age: 55
End: 2025-05-27
Payer: COMMERCIAL

## 2025-05-27 RX ORDER — TIRZEPATIDE 15 MG/.5ML
15 INJECTION, SOLUTION SUBCUTANEOUS
Qty: 2 ML | Refills: 0 | Status: ACTIVE | OUTPATIENT
Start: 2025-05-27

## 2025-05-27 NOTE — TELEPHONE ENCOUNTER
Pt confirmed general tolerability with Zepbound 12.5 mg. To avoid unnecessary delays in titration, sent order to [facility] for Zepbound 15 mg. Will continue with scheduled follow-up.

## 2025-06-04 ENCOUNTER — PATIENT MESSAGE (OUTPATIENT)
Dept: INTERNAL MEDICINE | Facility: CLINIC | Age: 55
End: 2025-06-04

## 2025-06-04 ENCOUNTER — OFFICE VISIT (OUTPATIENT)
Dept: INTERNAL MEDICINE | Facility: CLINIC | Age: 55
End: 2025-06-04
Payer: COMMERCIAL

## 2025-06-04 ENCOUNTER — PATIENT MESSAGE (OUTPATIENT)
Dept: ORTHOPEDICS | Facility: CLINIC | Age: 55
End: 2025-06-04
Payer: COMMERCIAL

## 2025-06-04 PROCEDURE — 99499 UNLISTED E&M SERVICE: CPT | Mod: 95,,,

## 2025-06-04 RX ORDER — TIRZEPATIDE 15 MG/.5ML
15 INJECTION, SOLUTION SUBCUTANEOUS
Qty: 2 ML | Refills: 2 | Status: ACTIVE | OUTPATIENT
Start: 2025-06-04

## 2025-06-15 ENCOUNTER — PATIENT MESSAGE (OUTPATIENT)
Dept: ADMINISTRATIVE | Facility: OTHER | Age: 55
End: 2025-06-15
Payer: COMMERCIAL

## 2025-06-27 ENCOUNTER — PATIENT MESSAGE (OUTPATIENT)
Dept: ADMINISTRATIVE | Facility: OTHER | Age: 55
End: 2025-06-27
Payer: COMMERCIAL

## 2025-06-27 ENCOUNTER — PATIENT OUTREACH (OUTPATIENT)
Dept: OTHER | Facility: OTHER | Age: 55
End: 2025-06-27
Payer: COMMERCIAL

## 2025-06-30 ENCOUNTER — PATIENT MESSAGE (OUTPATIENT)
Dept: ADMINISTRATIVE | Facility: OTHER | Age: 55
End: 2025-06-30
Payer: COMMERCIAL

## 2025-07-02 ENCOUNTER — LAB VISIT (OUTPATIENT)
Dept: LAB | Facility: HOSPITAL | Age: 55
End: 2025-07-02
Attending: INTERNAL MEDICINE
Payer: COMMERCIAL

## 2025-07-02 ENCOUNTER — TELEPHONE (OUTPATIENT)
Dept: INFECTIOUS DISEASES | Facility: CLINIC | Age: 55
End: 2025-07-02
Payer: COMMERCIAL

## 2025-07-02 DIAGNOSIS — Z23 IMMUNIZATION DUE: Primary | ICD-10-CM

## 2025-07-02 DIAGNOSIS — Z23 IMMUNIZATION DUE: ICD-10-CM

## 2025-07-02 PROCEDURE — 86765 RUBEOLA ANTIBODY: CPT

## 2025-07-02 PROCEDURE — 86762 RUBELLA ANTIBODY: CPT

## 2025-07-02 PROCEDURE — 86735 MUMPS ANTIBODY: CPT

## 2025-07-02 PROCEDURE — 36415 COLL VENOUS BLD VENIPUNCTURE: CPT

## 2025-07-03 LAB
MUMPS IGG (OHS): >300 AU/ML
MUMPS IGG INTERPRETATION (OHS): POSITIVE
RUBEOLA (MEASLES) IGG (OHS): >300 AU/ML
RUBEOLA IGG INTERP. (OHS): POSITIVE
RUBV IGG SER-ACNC: 52.4 IU/ML
RUBV IGG SER-IMP: REACTIVE

## 2025-07-13 DIAGNOSIS — N95.1 PERIMENOPAUSE: ICD-10-CM

## 2025-07-13 NOTE — TELEPHONE ENCOUNTER
Refill Routing Note   Medication(s) are not appropriate for processing by [facility] for the following reason(s):        Outside of protocol    [facility] action(s):  Route               Appointments  past 12m or future 3m with PCP    Date Provider   Last Visit   11/11/2024 Radha Patterson MD   Next Visit   Visit date not found Radha Patterson MD   ED visits in past 90 days: 0        Note composed:12:36 PM 07/13/2025

## 2025-07-14 ENCOUNTER — PATIENT MESSAGE (OUTPATIENT)
Dept: OTHER | Facility: OTHER | Age: 55
End: 2025-07-14
Payer: COMMERCIAL

## 2025-07-15 ENCOUNTER — PATIENT MESSAGE (OUTPATIENT)
Dept: ADMINISTRATIVE | Facility: OTHER | Age: 55
End: 2025-07-15
Payer: COMMERCIAL

## 2025-07-15 RX ORDER — TESTOSTERONE CYPIONATE 200 MG/ML
50 INJECTION, SOLUTION INTRAMUSCULAR
Qty: 1 ML | Refills: 1 | Status: SHIPPED | OUTPATIENT
Start: 2025-07-15

## 2025-07-23 ENCOUNTER — IMMUNIZATION (OUTPATIENT)
Dept: INTERNAL MEDICINE | Facility: CLINIC | Age: 55
End: 2025-07-23
Payer: COMMERCIAL

## 2025-07-23 ENCOUNTER — PATIENT MESSAGE (OUTPATIENT)
Dept: ADMINISTRATIVE | Facility: OTHER | Age: 55
End: 2025-07-23
Payer: COMMERCIAL

## 2025-07-23 DIAGNOSIS — Z23 NEED FOR VACCINATION: Primary | ICD-10-CM

## 2025-07-23 PROCEDURE — 90480 ADMN SARSCOV2 VAC 1/ONLY CMP: CPT | Mod: S$GLB,,, | Performed by: INTERNAL MEDICINE

## 2025-07-23 PROCEDURE — 91320 SARSCV2 VAC 30MCG TRS-SUC IM: CPT | Mod: S$GLB,,, | Performed by: INTERNAL MEDICINE

## 2025-07-28 ENCOUNTER — PATIENT MESSAGE (OUTPATIENT)
Dept: ADMINISTRATIVE | Facility: OTHER | Age: 55
End: 2025-07-28
Payer: COMMERCIAL

## 2025-08-05 ENCOUNTER — PATIENT OUTREACH (OUTPATIENT)
Dept: OTHER | Facility: OTHER | Age: 55
End: 2025-08-05
Payer: COMMERCIAL

## 2025-08-05 ENCOUNTER — PATIENT MESSAGE (OUTPATIENT)
Dept: ADMINISTRATIVE | Facility: OTHER | Age: 55
End: 2025-08-05
Payer: COMMERCIAL

## 2025-08-14 ENCOUNTER — PATIENT OUTREACH (OUTPATIENT)
Dept: OTHER | Facility: OTHER | Age: 55
End: 2025-08-14
Payer: COMMERCIAL

## 2025-08-14 DIAGNOSIS — L65.9 HAIR LOSS: ICD-10-CM

## 2025-08-14 DIAGNOSIS — M79.641 RIGHT HAND PAIN: Primary | ICD-10-CM

## 2025-08-14 RX ORDER — FINASTERIDE 1 MG/1
1 TABLET, FILM COATED ORAL DAILY
Qty: 90 TABLET | Refills: 1 | Status: SHIPPED | OUTPATIENT
Start: 2025-08-14

## 2025-08-15 ENCOUNTER — TELEPHONE (OUTPATIENT)
Dept: ENDOSCOPY | Facility: HOSPITAL | Age: 55
End: 2025-08-15
Payer: COMMERCIAL

## 2025-08-19 ENCOUNTER — PATIENT MESSAGE (OUTPATIENT)
Dept: ORTHOPEDICS | Facility: CLINIC | Age: 55
End: 2025-08-19
Payer: COMMERCIAL

## 2025-08-19 ENCOUNTER — PATIENT MESSAGE (OUTPATIENT)
Dept: ADMINISTRATIVE | Facility: OTHER | Age: 55
End: 2025-08-19
Payer: COMMERCIAL

## 2025-08-19 ENCOUNTER — PATIENT MESSAGE (OUTPATIENT)
Dept: OTHER | Facility: OTHER | Age: 55
End: 2025-08-19
Payer: COMMERCIAL

## 2025-08-21 ENCOUNTER — PATIENT MESSAGE (OUTPATIENT)
Dept: ORTHOPEDICS | Facility: CLINIC | Age: 55
End: 2025-08-21
Payer: COMMERCIAL

## 2025-08-22 ENCOUNTER — PATIENT MESSAGE (OUTPATIENT)
Dept: ADMINISTRATIVE | Facility: OTHER | Age: 55
End: 2025-08-22
Payer: COMMERCIAL

## 2025-08-25 ENCOUNTER — OFFICE VISIT (OUTPATIENT)
Dept: ORTHOPEDICS | Facility: CLINIC | Age: 55
End: 2025-08-25
Payer: COMMERCIAL

## 2025-08-25 ENCOUNTER — HOSPITAL ENCOUNTER (OUTPATIENT)
Dept: RADIOLOGY | Facility: OTHER | Age: 55
Discharge: HOME | End: 2025-08-25
Attending: ORTHOPAEDIC SURGERY
Payer: COMMERCIAL

## 2025-08-25 VITALS — HEIGHT: 63 IN | WEIGHT: 143.31 LBS | BODY MASS INDEX: 25.39 KG/M2

## 2025-08-25 DIAGNOSIS — G56.03 BILATERAL CARPAL TUNNEL SYNDROME: Primary | ICD-10-CM

## 2025-08-25 DIAGNOSIS — M65.331 TRIGGER FINGER, RIGHT MIDDLE FINGER: ICD-10-CM

## 2025-08-25 DIAGNOSIS — M65.321 TRIGGER FINGER, RIGHT INDEX FINGER: Primary | ICD-10-CM

## 2025-08-25 DIAGNOSIS — M79.641 RIGHT HAND PAIN: ICD-10-CM

## 2025-08-25 PROCEDURE — 73130 X-RAY EXAM OF HAND: CPT | Mod: TC,RT

## 2025-08-25 PROCEDURE — 99999 PR PBB SHADOW E&M-EST. PATIENT-LVL III: CPT | Mod: PBBFAC,,, | Performed by: ORTHOPAEDIC SURGERY

## 2025-08-25 PROCEDURE — 73130 X-RAY EXAM OF HAND: CPT | Mod: 26,RT,, | Performed by: RADIOLOGY

## 2025-08-25 RX ORDER — METHYLPREDNISOLONE ACETATE 40 MG/ML
20 INJECTION, SUSPENSION INTRA-ARTICULAR; INTRALESIONAL; INTRAMUSCULAR; SOFT TISSUE
Status: DISCONTINUED | OUTPATIENT
Start: 2025-08-25 | End: 2025-08-25 | Stop reason: HOSPADM

## 2025-08-25 RX ADMIN — METHYLPREDNISOLONE ACETATE 20 MG: 40 INJECTION, SUSPENSION INTRA-ARTICULAR; INTRALESIONAL; INTRAMUSCULAR; SOFT TISSUE at 08:08

## 2025-09-04 ENCOUNTER — OFFICE VISIT (OUTPATIENT)
Dept: INTERNAL MEDICINE | Facility: CLINIC | Age: 55
End: 2025-09-04
Payer: COMMERCIAL

## 2025-09-04 RX ORDER — TIRZEPATIDE 15 MG/.5ML
15 INJECTION, SOLUTION SUBCUTANEOUS
Qty: 2 ML | Refills: 5 | Status: ACTIVE | OUTPATIENT
Start: 2025-09-04

## (undated) DEVICE — SUT V-LOC ABSRB WOUND CLSR

## (undated) DEVICE — JELLY SURGILUBE 5GR

## (undated) DEVICE — DEVICE KOH EFCNT RUMI 4.0CM

## (undated) DEVICE — DRAPE COLUMN DAVINCI XI

## (undated) DEVICE — NDL INSUFFLATION VERRES 120MM

## (undated) DEVICE — KIT WING PAD POSITIONING

## (undated) DEVICE — ELECTRODE REM PLYHSV RETURN 9

## (undated) DEVICE — SOL STRL WATER INJ 1000ML BG

## (undated) DEVICE — GLOVE SENSICARE PI GRN 6

## (undated) DEVICE — GLOVE SENSICARE PI MICRO 7.5

## (undated) DEVICE — GOWN NONREINF SET-IN SLV XL

## (undated) DEVICE — SUT VICRYL 0 27 CT-2

## (undated) DEVICE — SYS SEE SHARP SCP ANTIFG LNG

## (undated) DEVICE — COVER TIP CURVED SCISSORS XI

## (undated) DEVICE — SOL POVIDONE SCRUB IODINE 4 OZ

## (undated) DEVICE — GLOVE SENSICARE PI MICRO 6

## (undated) DEVICE — SOL POVIDONE PREP IODINE 4 OZ

## (undated) DEVICE — SUT MCRYL PLUS 4-0 PS2 27IN

## (undated) DEVICE — SUT VICRYL PLUS 0 CT1 36IN

## (undated) DEVICE — DRAPE ARM DAVINCI XI

## (undated) DEVICE — IRRIGATOR ENDOSCOPY DISP.

## (undated) DEVICE — TOWEL OR DISP STRL BLUE 4/PK

## (undated) DEVICE — SET TRI-LUMEN FILTERED TUBE

## (undated) DEVICE — INSERT CUSHIONPRONE VIEW LARGE

## (undated) DEVICE — TRAY DO THE ROBOT

## (undated) DEVICE — SEAL UNIVERSAL 5MM-8MM XI

## (undated) DEVICE — SOL ELECTROLUBE ANTI-STIC

## (undated) DEVICE — SOL IRRI STRL WATER 1000ML

## (undated) DEVICE — SOL NORMAL USPCA 0.9%

## (undated) DEVICE — ELECTRODE NEEDLE 1IN

## (undated) DEVICE — OBTURATOR BLADELESS 8MM XI CLR

## (undated) DEVICE — TIP RUMI GREEN DISPOSABLE6.7MM